# Patient Record
Sex: FEMALE | Race: WHITE | Employment: FULL TIME | URBAN - METROPOLITAN AREA
[De-identification: names, ages, dates, MRNs, and addresses within clinical notes are randomized per-mention and may not be internally consistent; named-entity substitution may affect disease eponyms.]

---

## 2017-09-08 ENCOUNTER — HOSPITAL ENCOUNTER (OUTPATIENT)
Dept: WOMENS IMAGING | Age: 57
Discharge: HOME | End: 2017-09-08
Attending: FAMILY MEDICINE | Admitting: FAMILY MEDICINE

## 2017-09-08 DIAGNOSIS — Z12.31 VISIT FOR SCREENING MAMMOGRAM: ICD-10-CM

## 2019-02-07 ENCOUNTER — HOSPITAL ENCOUNTER (OUTPATIENT)
Dept: WOMENS IMAGING | Age: 59
Discharge: HOME | End: 2019-02-07
Payer: COMMERCIAL

## 2019-02-07 DIAGNOSIS — Z12.31 VISIT FOR SCREENING MAMMOGRAM: ICD-10-CM

## 2019-02-07 PROCEDURE — 77063 BREAST TOMOSYNTHESIS BI: CPT

## 2020-02-10 ENCOUNTER — HOSPITAL ENCOUNTER (OUTPATIENT)
Dept: WOMENS IMAGING | Age: 60
Discharge: HOME | End: 2020-02-10
Payer: COMMERCIAL

## 2020-02-10 PROCEDURE — 77063 BREAST TOMOSYNTHESIS BI: CPT

## 2020-03-10 ENCOUNTER — APPOINTMENT (OUTPATIENT)
Dept: GENERAL RADIOLOGY | Age: 60
End: 2020-03-10
Payer: COMMERCIAL

## 2020-03-10 ENCOUNTER — HOSPITAL ENCOUNTER (EMERGENCY)
Age: 60
Discharge: HOME | End: 2020-03-10
Payer: COMMERCIAL

## 2020-03-10 VITALS
HEART RATE: 71 BPM | WEIGHT: 125.88 LBS | SYSTOLIC BLOOD PRESSURE: 131 MMHG | HEIGHT: 67 IN | DIASTOLIC BLOOD PRESSURE: 74 MMHG | RESPIRATION RATE: 18 BRPM | OXYGEN SATURATION: 99 % | TEMPERATURE: 97.6 F | BODY MASS INDEX: 19.76 KG/M2

## 2020-03-10 LAB
ANION GAP SERPL CALCULATED.3IONS-SCNC: 13 MMOL/L (ref 3–16)
BASOPHILS ABSOLUTE: 0 K/UL (ref 0–0.2)
BASOPHILS RELATIVE PERCENT: 0.5 %
BUN BLDV-MCNC: 10 MG/DL (ref 7–20)
CALCIUM SERPL-MCNC: 9.1 MG/DL (ref 8.3–10.6)
CHLORIDE BLD-SCNC: 103 MMOL/L (ref 99–110)
CO2: 23 MMOL/L (ref 21–32)
CREAT SERPL-MCNC: <0.5 MG/DL (ref 0.6–1.1)
EKG ATRIAL RATE: 74 BPM
EKG DIAGNOSIS: NORMAL
EKG P AXIS: 77 DEGREES
EKG P-R INTERVAL: 162 MS
EKG Q-T INTERVAL: 410 MS
EKG QRS DURATION: 74 MS
EKG QTC CALCULATION (BAZETT): 455 MS
EKG R AXIS: 57 DEGREES
EKG T AXIS: 59 DEGREES
EKG VENTRICULAR RATE: 74 BPM
EOSINOPHILS ABSOLUTE: 0 K/UL (ref 0–0.6)
EOSINOPHILS RELATIVE PERCENT: 0.8 %
GFR AFRICAN AMERICAN: >60
GFR NON-AFRICAN AMERICAN: >60
GLUCOSE BLD-MCNC: 102 MG/DL (ref 70–99)
HCT VFR BLD CALC: 39.7 % (ref 36–48)
HEMOGLOBIN: 13.4 G/DL (ref 12–16)
LYMPHOCYTES ABSOLUTE: 1.2 K/UL (ref 1–5.1)
LYMPHOCYTES RELATIVE PERCENT: 28.5 %
MCH RBC QN AUTO: 30.6 PG (ref 26–34)
MCHC RBC AUTO-ENTMCNC: 33.9 G/DL (ref 31–36)
MCV RBC AUTO: 90.5 FL (ref 80–100)
MONOCYTES ABSOLUTE: 0.3 K/UL (ref 0–1.3)
MONOCYTES RELATIVE PERCENT: 7 %
NEUTROPHILS ABSOLUTE: 2.7 K/UL (ref 1.7–7.7)
NEUTROPHILS RELATIVE PERCENT: 63.2 %
PDW BLD-RTO: 13.7 % (ref 12.4–15.4)
PLATELET # BLD: 161 K/UL (ref 135–450)
PMV BLD AUTO: 8.9 FL (ref 5–10.5)
POTASSIUM SERPL-SCNC: 3.9 MMOL/L (ref 3.5–5.1)
RBC # BLD: 4.38 M/UL (ref 4–5.2)
SODIUM BLD-SCNC: 139 MMOL/L (ref 136–145)
TROPONIN: <0.01 NG/ML
WBC # BLD: 4.3 K/UL (ref 4–11)

## 2020-03-10 PROCEDURE — 71046 X-RAY EXAM CHEST 2 VIEWS: CPT

## 2020-03-10 PROCEDURE — 93005 ELECTROCARDIOGRAM TRACING: CPT | Performed by: PHYSICIAN ASSISTANT

## 2020-03-10 PROCEDURE — 93010 ELECTROCARDIOGRAM REPORT: CPT | Performed by: INTERNAL MEDICINE

## 2020-03-10 PROCEDURE — 84484 ASSAY OF TROPONIN QUANT: CPT

## 2020-03-10 PROCEDURE — 80048 BASIC METABOLIC PNL TOTAL CA: CPT

## 2020-03-10 PROCEDURE — 85025 COMPLETE CBC W/AUTO DIFF WBC: CPT

## 2020-03-10 PROCEDURE — 99284 EMERGENCY DEPT VISIT MOD MDM: CPT

## 2020-03-10 RX ORDER — METHOCARBAMOL 750 MG/1
750 TABLET, FILM COATED ORAL 2 TIMES DAILY
Qty: 20 TABLET | Refills: 0 | Status: SHIPPED | OUTPATIENT
Start: 2020-03-10 | End: 2021-03-11

## 2020-03-10 ASSESSMENT — ENCOUNTER SYMPTOMS
FACIAL SWELLING: 0
SHORTNESS OF BREATH: 0
APNEA: 0
SORE THROAT: 0
VOMITING: 0
EYE REDNESS: 0
ABDOMINAL PAIN: 0
EYE DISCHARGE: 0
BACK PAIN: 0
NAUSEA: 0
CHOKING: 0

## 2020-03-10 ASSESSMENT — PAIN SCALES - GENERAL: PAINLEVEL_OUTOF10: 6

## 2020-03-10 ASSESSMENT — PAIN DESCRIPTION - DESCRIPTORS: DESCRIPTORS: ACHING

## 2020-03-10 ASSESSMENT — PAIN DESCRIPTION - ORIENTATION: ORIENTATION: UPPER

## 2020-03-10 ASSESSMENT — PAIN DESCRIPTION - LOCATION: LOCATION: ARM

## 2020-03-10 ASSESSMENT — PAIN DESCRIPTION - PAIN TYPE: TYPE: ACUTE PAIN

## 2020-03-10 ASSESSMENT — PAIN DESCRIPTION - PROGRESSION: CLINICAL_PROGRESSION: GRADUALLY WORSENING

## 2020-03-10 ASSESSMENT — PAIN DESCRIPTION - FREQUENCY: FREQUENCY: CONTINUOUS

## 2020-03-10 NOTE — ED PROVIDER NOTES
**EVALUATED BY ADVANCED PRACTICE PROVIDER**        [address] [facility]      Pt Name: Lashonda Rizzo  AIDAN:[id number]  [place] 1960  Date of evaluation: 3/10/2020  Provider: Beena Nicholson PA-C      Chief Complaint:    Chief Complaint   Patient presents with    Arm Pain     left upper arm starting last night. squeezing sensation. deneis numbness, tingling, denies weakness. pt reports these episodes have happened in the past.        Nursing Notes, Past Medical Hx, Past Surgical Hx, Social Hx, Allergies, and Family Hx were all reviewed and agreed with or any disagreements were addressed in the HPI.    HPI:  (Location, Duration, Timing, Severity, Quality, Assoc Sx, Context, Modifying factors)  This is a  64 y.o. female complaint of left arm tightness. She says her upper arm is been getting and feeling tight like it was squeezing like go. She is concerned for possible heart attack. She denies shortness of breath, no chest pain, no extremity weakness. No back pain or neck pain. Denies abdominal pain. No nausea vomiting. No diaphoresis. And she is unsure if is related to her not sleeping well. Her sleep patterns been off she did not sleep 2 or 3 hours and then for couple hours and go back to bed for couple hours. This is been going on for the last couple of weeks. She does work third shift but she is been working third shift for 11 years. PastMedical/Surgical History:      Diagnosis Date    Diverticulitis     Dysmenorrhea     Heavy periods     Irregular uterine bleeding     Nerve damage     Ovarian cyst          Procedure Laterality Date     SECTION      HERNIA REPAIR      OVARIAN CYST REMOVAL      THYROID SURGERY      TUBAL LIGATION         Medications:  Previous Medications    No medications on file         Review of Systems:  Review of Systems   Constitutional: Negative for chills and fever.    HENT: Negative for congestion, facial swelling and sore throat. Eyes: Negative for discharge and redness. Respiratory: Negative for apnea, choking and shortness of breath. Cardiovascular: Negative for chest pain. Gastrointestinal: Negative for abdominal pain, nausea and vomiting. Genitourinary: Negative for dysuria. Musculoskeletal: Negative for back pain, neck pain and neck stiffness. Neurological: Negative for dizziness, tremors, seizures, weakness and headaches. All other systems reviewed and are negative. Positives and Pertinent negatives as per HPI. Except as noted above in the ROS, problem specific ROS was completed and is negative. Physical Exam:  Physical Exam  Vitals signs and nursing note reviewed. Constitutional:       Appearance: She is well-developed. She is not diaphoretic. HENT:      Head: Normocephalic and atraumatic. Nose: Nose normal.   Eyes:      General:         Right eye: No discharge. Left eye: No discharge. Extraocular Movements: Extraocular movements intact. Conjunctiva/sclera: Conjunctivae normal.      Pupils: Pupils are equal, round, and reactive to light. Neck:      Musculoskeletal: Normal range of motion and neck supple. Cardiovascular:      Rate and Rhythm: Normal rate and regular rhythm. Heart sounds: Normal heart sounds. No murmur. No friction rub. No gallop. Pulmonary:      Effort: Pulmonary effort is normal. No respiratory distress. Breath sounds: Normal breath sounds. No wheezing or rales. Chest:      Chest wall: No tenderness. Abdominal:      General: Abdomen is flat. Bowel sounds are normal. There is no distension. Palpations: Abdomen is soft. There is no mass. Tenderness: There is no abdominal tenderness. Hernia: No hernia is present. Musculoskeletal: Normal range of motion. Left shoulder: She exhibits normal range of motion, no tenderness, no bony tenderness, no swelling and no deformity.       Left wrist: She exhibits normal range of motion, no tenderness and no bony tenderness. Left upper arm: She exhibits no tenderness, no bony tenderness, no swelling, no edema and no deformity. Left forearm: She exhibits no tenderness, no bony tenderness, no swelling and no deformity. Skin:     General: Skin is warm and dry. Neurological:      Mental Status: She is alert and oriented to person, place, and time. Psychiatric:         Behavior: Behavior normal.         MEDICAL DECISION MAKING    Vitals:    Vitals:    03/10/20 1407   BP: 125/78   Pulse: 72   Resp: 16   Temp: 97.4 °F (36.3 °C)   TempSrc: Oral   SpO2: 98%   Weight: 125 lb 14.1 oz (57.1 kg)   Height: 5' 7\" (1.702 m)       LABS:  Labs Reviewed   BASIC METABOLIC PANEL - Abnormal; Notable for the following components:       Result Value    Glucose 102 (*)     CREATININE <0.5 (*)     All other components within normal limits    Narrative:     Performed at:  [address] [address]   Phone [phone]   CBC WITH AUTO DIFFERENTIAL    Narrative:     Performed at:  [address] [address]   Phone [phone]   TROPONIN    Narrative:     Performed at:  [address] [address]   Phone [phone] of labs reviewed and werenegative at this time or not returned at the time of this note. RADIOLOGY:   Non-plain film images such as CT, Ultrasound and MRI are read by the radiologist. Magnolia Tineo PA-C have directly visualized the radiologic plain film image(s) with the below findings:        Interpretation per the Radiologist below, if available at the time of this note:    XR CHEST STANDARD (2 VW)   Final Result   No acute cardiopulmonary disease.               Otoniel Mikey Digital Screen Bilateral    Result Date: 2/10/2020  EXAMINATION: was available for consultation as needed. The patient and / or the family were informed of the results of any tests, a time was given to answer questions, a plan was proposed and they agreed with plan. CLINICAL IMPRESSION:  1.  Left upper arm pain        DISPOSITION Decision To Discharge 03/10/2020 04:59:37 PM      PATIENT REFERRED TO:  Ronald Ledezma MD  [address] [place] [address]  [phone]    Call in 1 day        DISCHARGE MEDICATIONS:  New Prescriptions    METHOCARBAMOL (ROBAXIN-750) 750 MG TABLET    Take 1 tablet by mouth 2 times daily       DISCONTINUED MEDICATIONS:  Discontinued Medications    No medications on file              (Please note the MDM and HPI sections of this note were completed with a voice recognition program.  Efforts were made to edit the dictations but occasionally words are mis-transcribed.)    Electronically signed, Lamine Reyes PA-C,          Lamine Reyes PA-C  03/10/20 0217

## 2020-03-24 ENCOUNTER — TELEPHONE (OUTPATIENT)
Dept: ORTHOPEDIC SURGERY | Age: 60
End: 2020-03-24

## 2020-06-11 ENCOUNTER — HOSPITAL ENCOUNTER (EMERGENCY)
Age: 60
Discharge: HOME | End: 2020-06-11
Attending: EMERGENCY MEDICINE
Payer: COMMERCIAL

## 2020-06-11 VITALS
RESPIRATION RATE: 16 BRPM | OXYGEN SATURATION: 96 % | SYSTOLIC BLOOD PRESSURE: 122 MMHG | BODY MASS INDEX: 19.3 KG/M2 | TEMPERATURE: 97.7 F | WEIGHT: 123.24 LBS | HEART RATE: 93 BPM | DIASTOLIC BLOOD PRESSURE: 80 MMHG

## 2020-06-11 PROCEDURE — 99282 EMERGENCY DEPT VISIT SF MDM: CPT

## 2020-06-11 RX ORDER — CYCLOBENZAPRINE HCL 5 MG
5 TABLET ORAL 2 TIMES DAILY PRN
Qty: 10 TABLET | Refills: 0 | Status: SHIPPED | OUTPATIENT
Start: 2020-06-11 | End: 2020-06-21

## 2020-06-11 RX ORDER — CYCLOBENZAPRINE HCL 5 MG
5 TABLET ORAL 2 TIMES DAILY PRN
Qty: 10 TABLET | Refills: 0 | Status: SHIPPED | OUTPATIENT
Start: 2020-06-11 | End: 2020-06-11 | Stop reason: SDUPTHER

## 2020-06-11 ASSESSMENT — PAIN DESCRIPTION - ORIENTATION: ORIENTATION: RIGHT

## 2020-06-11 ASSESSMENT — ENCOUNTER SYMPTOMS
VOMITING: 0
NAUSEA: 0

## 2020-06-11 ASSESSMENT — PAIN DESCRIPTION - FREQUENCY: FREQUENCY: CONTINUOUS

## 2020-06-11 ASSESSMENT — PAIN SCALES - GENERAL: PAINLEVEL_OUTOF10: 6

## 2020-06-11 ASSESSMENT — PAIN DESCRIPTION - DESCRIPTORS: DESCRIPTORS: BURNING;TIGHTNESS

## 2020-06-11 ASSESSMENT — PAIN DESCRIPTION - LOCATION: LOCATION: LEG

## 2020-06-11 NOTE — ED PROVIDER NOTES
SpO2 Height Weight   122/80 97.7 °F (36.5 °C) Oral 93 16 96 % -- 123 lb 3.8 oz (55.9 kg)       Physical Exam  Vitals signs and nursing note reviewed. Constitutional:       General: She is not in acute distress. Appearance: She is well-developed. She is not ill-appearing, toxic-appearing or diaphoretic. Comments: Well-appearing, nontoxic, not in acute distress. Answers questions in full sentences and following verbal commands appropriately   HENT:      Head: Normocephalic and atraumatic. Eyes:      General:         Right eye: No discharge. Left eye: No discharge. Conjunctiva/sclera: Conjunctivae normal.   Neck:      Musculoskeletal: Normal range of motion and neck supple. Cardiovascular:      Pulses:           Dorsalis pedis pulses are 2+ on the right side and 2+ on the left side. Pulmonary:      Effort: Pulmonary effort is normal. No respiratory distress. Musculoskeletal: Normal range of motion. Right upper leg: She exhibits tenderness. She exhibits no bony tenderness, no swelling, no edema, no deformity and no laceration. Skin:     Coloration: Skin is not pale. Neurological:      Mental Status: She is alert and oriented to person, place, and time. Deep Tendon Reflexes:      Reflex Scores:       Patellar reflexes are 2+ on the right side and 2+ on the left side. Achilles reflexes are 2+ on the right side and 2+ on the left side. Comments: Gait normal   Psychiatric:         Behavior: Behavior normal. Behavior is cooperative.          DIAGNOSTIC RESULTS     EKG: All EKG's are interpreted by the Emergency Department Physicianwho either signs or Co-signs this chart in the absence of a cardiologist.      RADIOLOGY:   Non-plain film images such as CT, Ultrasound and MRI are read by the radiologist. Plain radiographic images are visualized and preliminarily interpreted by the emergency physician with the below findings:      Interpretation per the Radiologist below,

## 2020-06-13 ENCOUNTER — APPOINTMENT (OUTPATIENT)
Dept: CT IMAGING | Age: 60
End: 2020-06-13
Payer: COMMERCIAL

## 2020-06-13 ENCOUNTER — HOSPITAL ENCOUNTER (EMERGENCY)
Age: 60
Discharge: HOME | End: 2020-06-13
Attending: EMERGENCY MEDICINE
Payer: COMMERCIAL

## 2020-06-13 VITALS
SYSTOLIC BLOOD PRESSURE: 138 MMHG | RESPIRATION RATE: 20 BRPM | WEIGHT: 123.9 LBS | HEART RATE: 86 BPM | TEMPERATURE: 98.4 F | HEIGHT: 64 IN | OXYGEN SATURATION: 98 % | DIASTOLIC BLOOD PRESSURE: 70 MMHG | BODY MASS INDEX: 21.15 KG/M2

## 2020-06-13 PROCEDURE — 3209999900 CT LUMBAR SPINE TRAUMA RECONSTRUCTION

## 2020-06-13 PROCEDURE — 74176 CT ABD & PELVIS W/O CONTRAST: CPT

## 2020-06-13 PROCEDURE — 6370000000 HC RX 637 (ALT 250 FOR IP): Performed by: EMERGENCY MEDICINE

## 2020-06-13 PROCEDURE — 6360000002 HC RX W HCPCS: Performed by: EMERGENCY MEDICINE

## 2020-06-13 PROCEDURE — 99283 EMERGENCY DEPT VISIT LOW MDM: CPT

## 2020-06-13 RX ORDER — ONDANSETRON 4 MG/1
4 TABLET, FILM COATED ORAL ONCE
Status: COMPLETED | OUTPATIENT
Start: 2020-06-13 | End: 2020-06-13

## 2020-06-13 RX ORDER — FENTANYL CITRATE 50 UG/ML
100 INJECTION, SOLUTION INTRAMUSCULAR; INTRAVENOUS ONCE
Status: COMPLETED | OUTPATIENT
Start: 2020-06-13 | End: 2020-06-13

## 2020-06-13 RX ORDER — DEXAMETHASONE 4 MG/1
4 TABLET ORAL ONCE
Status: COMPLETED | OUTPATIENT
Start: 2020-06-13 | End: 2020-06-13

## 2020-06-13 RX ORDER — MELOXICAM 7.5 MG/1
7.5 TABLET ORAL DAILY
Qty: 90 TABLET | Refills: 1 | OUTPATIENT
Start: 2020-06-13 | End: 2021-03-11

## 2020-06-13 RX ORDER — HYDROCODONE BITARTRATE AND ACETAMINOPHEN 5; 325 MG/1; MG/1
1 TABLET ORAL EVERY 4 HOURS PRN
Qty: 18 TABLET | Refills: 0 | Status: SHIPPED | OUTPATIENT
Start: 2020-06-13 | End: 2020-06-16

## 2020-06-13 RX ORDER — METHOCARBAMOL 500 MG/1
500 TABLET, FILM COATED ORAL 4 TIMES DAILY
Qty: 40 TABLET | Refills: 0 | Status: SHIPPED | OUTPATIENT
Start: 2020-06-13 | End: 2020-06-23

## 2020-06-13 RX ADMIN — FENTANYL CITRATE 100 MCG: 50 INJECTION, SOLUTION INTRAMUSCULAR; INTRAVENOUS at 20:00

## 2020-06-13 RX ADMIN — ONDANSETRON HYDROCHLORIDE 4 MG: 4 TABLET, FILM COATED ORAL at 20:00

## 2020-06-13 RX ADMIN — DEXAMETHASONE 4 MG: 4 TABLET ORAL at 20:00

## 2020-06-13 ASSESSMENT — PAIN DESCRIPTION - LOCATION: LOCATION: BACK;LEG

## 2020-06-13 ASSESSMENT — PAIN DESCRIPTION - PROGRESSION
CLINICAL_PROGRESSION: RESOLVED
CLINICAL_PROGRESSION: GRADUALLY WORSENING

## 2020-06-13 ASSESSMENT — PAIN SCALES - GENERAL: PAINLEVEL_OUTOF10: 10

## 2020-06-13 ASSESSMENT — PAIN DESCRIPTION - FREQUENCY: FREQUENCY: CONTINUOUS

## 2020-06-13 NOTE — ED PROVIDER NOTES
10 tablet 0    methocarbamol (ROBAXIN-750) 750 MG tablet Take 1 tablet by mouth 2 times daily 20 tablet 0     No Known Allergies    Nursing Notes Reviewed    Physical Exam:  Triage VS:    ED Triage Vitals [06/13/20 1929]   Enc Vitals Group      /70      Pulse 86      Resp 20      Temp 98.4 °F (36.9 °C)      Temp Source Oral      SpO2 98 %      Weight 123 lb 14.4 oz (56.2 kg)      Height 5' 4\" (1.626 m)      Head Circumference       Peak Flow       Pain Score       Pain Loc       Pain Edu? Excl. in [address]? My pulse ox interpretation is - normal    General appearance:  No acute distress. Skin:  Warm. Dry. No Rash   Eye:  Extraocular movements intact. Ears, nose, mouth and throat:  Oral mucosa moist   Neck:  Trachea midline. Extremity:  No swelling. Normal ROM     Heart:  Regular  Perfusion:  intact  Respiratory:   Respirations nonlabored. Abdominal:  Normal bowel sounds. Soft. Nontender. Non distended. Back:  No CVA tenderness to palpation, no midline spinal tenderness to palpation or step-offs, mild positive point tenderness at T12-L1 and L2 as well as bilateral paraspinal muscle tenderness to palpation, no spasm             Neurological:  Alert and oriented times 3.  5 out of 5 motor strength bilateral lower extremities, sensation intact to light touch in bilateral lower                                  extremities, lower extremity reflexes of the patella and achilles are equal and intact. Straight leg test is not present. Antalgic gait. I have reviewed and interpreted all of the currently available lab results from this visit (if applicable):  No results found for this visit on 06/13/20. Radiographs (if obtained):  Radiologist's Report Reviewed:  No results found. MDM:  Patient presented with back pain.   There is no evidence for more malignant injury/pathology, such as, but not limited to, cauda equina syndrome, acute spinal cord pathology, Spinal epidural abscess    I

## 2020-06-14 NOTE — ED NOTES
Discharge instructions reviewed with pt and pt denied having any questions. Discharge paperwork signed and pt discharged.         Steven Smith RN  06/13/20 6950

## 2020-08-29 ENCOUNTER — APPOINTMENT (OUTPATIENT)
Dept: GENERAL RADIOLOGY | Age: 60
End: 2020-08-29
Payer: COMMERCIAL

## 2020-08-29 ENCOUNTER — HOSPITAL ENCOUNTER (EMERGENCY)
Age: 60
Discharge: HOME | End: 2020-08-29
Attending: EMERGENCY MEDICINE
Payer: COMMERCIAL

## 2020-08-29 VITALS
DIASTOLIC BLOOD PRESSURE: 82 MMHG | BODY MASS INDEX: 21.95 KG/M2 | OXYGEN SATURATION: 98 % | SYSTOLIC BLOOD PRESSURE: 136 MMHG | HEART RATE: 76 BPM | TEMPERATURE: 98.2 F | WEIGHT: 127.87 LBS | RESPIRATION RATE: 16 BRPM

## 2020-08-29 PROCEDURE — 99284 EMERGENCY DEPT VISIT MOD MDM: CPT

## 2020-08-29 PROCEDURE — 73030 X-RAY EXAM OF SHOULDER: CPT

## 2020-08-29 RX ORDER — LIDOCAINE 50 MG/G
1 PATCH TOPICAL DAILY
Qty: 10 PATCH | Refills: 0 | Status: SHIPPED | OUTPATIENT
Start: 2020-08-29 | End: 2020-09-08

## 2020-08-29 RX ORDER — TIZANIDINE 4 MG/1
4 TABLET ORAL EVERY 6 HOURS PRN
Qty: 20 TABLET | Refills: 0 | Status: SHIPPED | OUTPATIENT
Start: 2020-08-29 | End: 2021-03-11

## 2020-08-29 ASSESSMENT — PAIN SCALES - GENERAL
PAINLEVEL_OUTOF10: 10
PAINLEVEL_OUTOF10: 0
PAINLEVEL_OUTOF10: 0

## 2020-08-29 ASSESSMENT — ENCOUNTER SYMPTOMS
COUGH: 0
PHOTOPHOBIA: 0
ABDOMINAL PAIN: 0
COLOR CHANGE: 0
TROUBLE SWALLOWING: 0
VOMITING: 0
SHORTNESS OF BREATH: 0

## 2020-08-29 ASSESSMENT — PAIN DESCRIPTION - FREQUENCY: FREQUENCY: CONTINUOUS

## 2020-08-29 ASSESSMENT — PAIN DESCRIPTION - PROGRESSION
CLINICAL_PROGRESSION: RESOLVED
CLINICAL_PROGRESSION: GRADUALLY WORSENING

## 2020-08-29 ASSESSMENT — PAIN DESCRIPTION - ONSET: ONSET: ON-GOING

## 2020-08-29 ASSESSMENT — PAIN DESCRIPTION - PAIN TYPE: TYPE: ACUTE PAIN;CHRONIC PAIN

## 2020-08-29 ASSESSMENT — PAIN DESCRIPTION - ORIENTATION: ORIENTATION: LEFT

## 2020-08-29 ASSESSMENT — PAIN - FUNCTIONAL ASSESSMENT: PAIN_FUNCTIONAL_ASSESSMENT: PREVENTS OR INTERFERES SOME ACTIVE ACTIVITIES AND ADLS

## 2020-08-29 ASSESSMENT — PAIN DESCRIPTION - DESCRIPTORS: DESCRIPTORS: DISCOMFORT;TENDER

## 2020-08-29 ASSESSMENT — PAIN DESCRIPTION - LOCATION: LOCATION: ARM;SHOULDER

## 2020-08-29 NOTE — ED PROVIDER NOTES
and frequency. Musculoskeletal: Positive for arthralgias and myalgias. Negative for gait problem and neck pain. Skin: Negative for color change, pallor and rash. Neurological: Negative for dizziness, light-headedness and headaches. Psychiatric/Behavioral: Negative for confusion. The patient is not nervous/anxious. All other systems reviewed and are negative. Except as noted above the remainder of the review of systems was reviewed and negative. PAST MEDICAL HISTORY     Past Medical History:   Diagnosis Date    Diverticulitis     Dysmenorrhea     Heavy periods     Irregular uterine bleeding     Nerve damage     Ovarian cyst          SURGICALHISTORY       Past Surgical History:   Procedure Laterality Date     SECTION      HERNIA REPAIR      OVARIAN CYST REMOVAL      THYROID SURGERY      TUBAL LIGATION           CURRENT MEDICATIONS       Discharge Medication List as of 2020  3:44 AM      CONTINUE these medications which have NOT CHANGED    Details   meloxicam (MOBIC) 7.5 MG tablet Take 1 tablet by mouth daily, Disp-90 tablet, R-1Print      methocarbamol (ROBAXIN-750) 750 MG tablet Take 1 tablet by mouth 2 times daily, Disp-20 tablet, R-0Print                  Nsaids and Orphenadrine citrate    FAMILY HISTORY       Family History   Problem Relation Age of Onset    Cancer Sister 61        uterine          SOCIAL HISTORY       Social History     Socioeconomic History    Marital status:       Spouse name: None    Number of children: None    Years of education: None    Highest education level: None   Occupational History    None   Social Needs    Financial resource strain: None    Food insecurity     Worry: None     Inability: None    Transportation needs     Medical: None     Non-medical: None   Tobacco Use    Smoking status: Former Smoker     Packs/day: 0.50     Years: 5.00     Pack years: 2.50     Types: Cigarettes    Smokeless tobacco: Never Used   Substance and Sexual Activity    Alcohol use: No    Drug use: No    Sexual activity: Yes     Partners: Male   Lifestyle    Physical activity     Days per week: None     Minutes per session: None    Stress: None   Relationships    Social connections     Talks on phone: None     Gets together: None     Attends [employer]ical service: None     Active member of club or organization: None     Attends meetings of clubs or organizations: None     Relationship status: None    Intimate partner violence     Fear of current or ex partner: None     Emotionally abused: None     Physically abused: None     Forced sexual activity: None   Other Topics Concern    None   Social History Narrative    None       SCREENINGS             PHYSICAL EXAM    (up to 7 for level 4, 8 or more for level 5)     ED Triage Vitals   BP Temp Temp Source Pulse Resp SpO2 Height Weight   08/29/20 0106 08/29/20 0105 08/29/20 0105 08/29/20 0105 08/29/20 0105 08/29/20 0105 -- 08/29/20 0104   (!) 161/84 98.3 °F (36.8 °C) Oral 90 16 95 %  127 lb 13.9 oz (58 kg)       Physical Exam  Vitals signs and nursing note reviewed. Constitutional:       Appearance: She is well-developed. HENT:      Head: Normocephalic and atraumatic. Mouth/Throat:      Mouth: Mucous membranes are dry. Eyes:      Extraocular Movements: Extraocular movements intact. Conjunctiva/sclera: Conjunctivae normal.      Pupils: Pupils are equal, round, and reactive to light. Neck:      Musculoskeletal: Normal range of motion. Trachea: No tracheal deviation. Cardiovascular:      Rate and Rhythm: Normal rate and regular rhythm. Pulses: Normal pulses. Heart sounds: Normal heart sounds. Pulmonary:      Effort: Pulmonary effort is normal.      Breath sounds: Normal breath sounds. Abdominal:      General: There is no distension. Palpations: Abdomen is soft. Tenderness: There is no abdominal tenderness. There is no guarding or rebound.    Musculoskeletal: Normal range of motion. General: No swelling, tenderness, deformity or signs of injury. Skin:     General: Skin is warm and dry. Capillary Refill: Capillary refill takes less than 2 seconds. Neurological:      General: No focal deficit present. Mental Status: She is alert and oriented to person, place, and time. Sensory: No sensory deficit. Motor: No weakness. RESULTS     EKG: All EKG's are interpreted by the Emergency Department Physician who either signs or Co-signsthis chart in the absence of a cardiologist.    RADIOLOGY:   Maral Neat such as CT, Ultrasound and MRI are read by the radiologist. Plain radiographic images are visualized and preliminarily interpreted by the emergency physician with the below findings:      Interpretation per the Radiologist below, if available at the time ofthis note:    XR SHOULDER LEFT (MIN 2 VIEWS)   Final Result   No acute abnormality. ED BEDSIDE ULTRASOUND:   Performed by ED Physician - none    LABS:  Labs Reviewed - No data to display    All other labs were within normal range or not returned as of this dictation. EMERGENCY DEPARTMENT COURSE and DIFFERENTIAL DIAGNOSIS/MDM:   Vitals:    Vitals:    08/29/20 0104 08/29/20 0105 08/29/20 0106 08/29/20 0328   BP:   (!) 161/84 136/82   Pulse:  90  76   Resp:  16  16   Temp:  98.3 °F (36.8 °C)  98.2 °F (36.8 °C)   TempSrc:  Oral  Oral   SpO2:  95%  98%   Weight: 127 lb 13.9 oz (58 kg)          Patient was given thefollowing medications:  Medications - No data to display    ED COURSE & MEDICAL DECISION MAKING    Pertinent Labs & Imaging studies reviewed. (See chart for details)   -  Patient seen and evaluated in the emergency department. -  Triage and nursing notes reviewed and incorporated. -  Old chart records reviewed and incorporated.   -  Differential diagnosis includes: Differential diagnosis: includes but not limited to Arterial Injury/Ischemia, Fracture, Dislocation, Infection, Compartment Syndrome, Neurologic Deficit/Injury. -  Work-up included:  See above  -  ED treatment included: See above  -  Results discussed with patient. . Imaging studies show no acute S normalities. The patient's pain is reproducible with positioning and movement and have low suspicion for the pulmonary cardiac etiology. Discussed treating the patient with topical lidocaine muscle relaxers. Patient feels well on reevaluation and has had no symptoms while in the emergency department. The patient is agreeable with plan of care and disposition. REASSESSMENT          CRITICAL CARE TIME   Total Critical Care time was 0 minutes, excluding separatelyreportable procedures. There was a high probability ofclinically significant/life threatening deterioration in the patient's condition which required my urgent intervention. CONSULTS:  None    PROCEDURES:  Unless otherwise noted below, none     Procedures    FINAL IMPRESSION      1.  Left shoulder pain, unspecified chronicity          DISPOSITION/PLAN   DISPOSITION Decision To Discharge 08/29/2020 02:37:11 AM      PATIENT REFERREDTO:  Eleazar Cruz MD  [address] [facility] [address]  [phone]    Schedule an appointment as soon as possible for a visit   As needed    [place] [phone]  Schedule an appointment as soon as possible for a visit   As needed      DISCHARGEMEDICATIONS:  Discharge Medication List as of 8/29/2020  3:44 AM      START taking these medications    Details   tiZANidine (ZANAFLEX) 4 MG tablet Take 1 tablet by mouth every 6 hours as needed (shoulder pain), Disp-20 tablet,R-0Print      lidocaine (LIDODERM) 5 % Place 1 patch onto the skin daily for 10 days 12 hours on, 12 hours off., Disp-10 patch,R-0Print                (Please note that portions of this note were completed with a voice recognition program.  Efforts were made to edit the dictations but occasionally words are mis-transcribed.)    Janie Ruvalcaba MD (electronically signed)  Attending Emergency Physician          Janie Ruvalcaba MD  08/29/20 4492

## 2020-08-29 NOTE — ED NOTES
Pt ambulated to ER bed #10 with a steady gait. No concerns voiced at this time.       Eunice Hodge RN  08/29/20 6717

## 2021-01-29 ENCOUNTER — HOSPITAL ENCOUNTER (OUTPATIENT)
Age: 61
Discharge: HOME | End: 2021-01-29
Payer: COMMERCIAL

## 2021-01-29 ENCOUNTER — HOSPITAL ENCOUNTER (OUTPATIENT)
Dept: GENERAL RADIOLOGY | Age: 61
Discharge: HOME | End: 2021-01-29
Payer: COMMERCIAL

## 2021-01-29 DIAGNOSIS — S76.012A HIP STRAIN, LEFT, INITIAL ENCOUNTER: ICD-10-CM

## 2021-01-29 PROCEDURE — 73502 X-RAY EXAM HIP UNI 2-3 VIEWS: CPT

## 2021-02-10 ENCOUNTER — HOSPITAL ENCOUNTER (OUTPATIENT)
Dept: PHYSICAL THERAPY | Age: 61
Setting detail: THERAPIES SERIES
Discharge: HOME | End: 2021-02-10
Payer: COMMERCIAL

## 2021-02-10 NOTE — FLOWSHEET NOTE
Physical Therapy  Cancellation/No-show Note  Patient Name:  Deanne Blanco  :  1960   Date:  2/10/2021  Cancelled visits to date: 1 2/10/21 Initial evaluation  No-shows to date: 0    For today's appointment patient:  [x]  Cancelled  []  Rescheduled appointment  []  No-show     Reason given by patient:  []  Patient ill  []  Conflicting appointment  []  No transportation    []  Conflict with work  []  No reason given  [x]  Other:     Comments:  2/10/21 weather related     Electronically signed by:  Brandyn Montez PT

## 2021-02-11 ENCOUNTER — HOSPITAL ENCOUNTER (OUTPATIENT)
Dept: PHYSICAL THERAPY | Age: 61
Setting detail: THERAPIES SERIES
Discharge: HOME | End: 2021-02-11
Payer: COMMERCIAL

## 2021-02-11 PROCEDURE — 97530 THERAPEUTIC ACTIVITIES: CPT

## 2021-02-11 PROCEDURE — 97110 THERAPEUTIC EXERCISES: CPT

## 2021-02-11 PROCEDURE — 97162 PT EVAL MOD COMPLEX 30 MIN: CPT

## 2021-02-11 NOTE — PLAN OF CARE
[facility]. [address]  Phone: [phone]   Fax:     [phone]                                                       Physical Therapy Certification    Dear Referring Practitioner: Dr. Gomez,    We had the pleasure of evaluating the following patient for physical therapy services at [facility]. A summary of our findings can be found in the initial assessment below. This includes our plan of care. If you have any questions or concerns regarding these findings, please do not hesitate to contact me at the office phone number checked above. Thank you for the referral.       Physician Signature:_______________________________Date:__________________  By signing above (or electronic signature), therapists plan is approved by physician                  Patient: Elizabeth Roche   : 1960   MRN: [id number]  Referring Physician: Referring Practitioner: Dr. Gomez      Evaluation Date: 2021      Medical Diagnosis Information:  Diagnosis: M54.5 (ICD-10-CM) - Low back pain   Treatment Diagnosis: decreased abilty to function                                         Insurance information: PT Insurance Information: [employer]   DOI- 2020  Precautions/ Contra-indications:   Latex Allergy:  [x]NO      []YES  Preferred Language for Healthcare:   [x]English       []other:    C-SSRS Triggered by Intake questionnaire (Past 2 wk assessment ):   [x] No, Questionnaire did not trigger screening.   [] Yes, Patient intake triggered C-SSRS Screening      [] C-SSRS Screening completed  [] PCP notified via Epic     SUBJECTIVE: Patient is a 62 y/o female who was pulling a skid at [employer] and unloading them on 20. She started having  left thigh first and then back pain a few days later. She has had nothing done except for xrays since. She had an mri today.   She c/o constant sharp nerve pain in her bialteral lb, hips and into her bilateral buttocks and hamstrings as well as left thigh and hip. She also has tingling in her vaginal area and tailbone. She says the pain is severe at times. She has used heating pads icy hot and some other things to help her back. She   Does  not know what makes it hurt more or feel better . She is working on light duty at this time. Denies changes in bowel or bladder.      Relevant Medical History:Additional Pertinent Hx: Natural fusion of C5,C6  Functional Outcome Measure: [place] =60    Pain Scale:4-7/10  Easing factors: she is not sure  Provocative factors:she is not sore    Type: [x]Constant   []Intermittent  []Radiating []Localized []other:     Numbness/Tingling: - above both hips, bilateral buttocks, left thigh, bilateral hams, right lower thigh, vagina    Occupation/School:-[employer] employee on light duty    Living Status/Prior Level of Function: Independent with ADLs and IADLs,     OBJECTIVE:   Posture:-left crest high, increased lumbar lordosis,         Palpation: right post rotated ilium, tight and tender in bilateral gluts, piriformis,     Gait: -ambulates with antalgic gait sb to the right , no arm swing or lumbar rotation        Repeated Movements- ext painful , flex is     ROM  Comments   Lumbar Flex wfl    Lumbar Ext 5 painful in right lb      ROM LEFT RIGHT Comments   Lumbar Side Bend 10 10 Pain in her right lb   Lumbar Rotation 50 50    Quadrant  + painful   Hip Flexion 100 100    Hip Abd 25 25    Hip ER 30 30    Hip IR 20 20    Hip Extension 5 5    Knee Ext      Knee Flex      Hamstring Flex -20 -20    Piriformis tight tight    Tanner test  + +             Myotomes/Strength Normal Abnormal Comments   [x]ALL NORMAL      Hip Abd 4 4    Hip Ext 4 4    Hip flexion (L1-L2 femoral) [x] []    Knee extension (L2-L4 femoral) [x] []    Knee flexion (S1 sciatic)      Dorsiflexion (L4-L5 deep peroneal) [x] []    Great Toe Ext (L5 deep peroneal nerve) [x] []    Ankle Eversion (S1-S2 super peroneal) [x] []    Ankle PF(S1-S2 tibial) [x] []    Multifidus [] [] 2/5   Transverse Ab [] [] 2/5     Dermatomes Normal Abnormal Comments   [x]ALL NORMAL         C/o tingling all around hips, bilateral hams, gluts, vagina   inguinal area (L1)  [] [x]    anterior mid-thigh (L2) [] [x]    distal ant thigh/med knee (L3) [] []    medial lower leg and foot (L4) [x] []    lateral lower leg and foot (L5) [x] []    posterior calf (S1) [x] []    medial calcaneus (S2) [x] []      Reflexes Normal Abnormal Comments   [x]ALL NORMAL            S1-2 Seated achilles [] [] Hyper reflexive with all   S1-2 Prone knee bend [] []    L3-4 Patellar tendon [] []    C5-6 Biceps [] []    C6 Brachioradialis [] []    C7-8 Triceps [] []    Clonus [] []    Babinski [] []    Garcia's [] []      Joint mobility:    []Normal    [x]Hypo   []Hyper        Orthopedic Special Tests:  Neural dynamic tension testing Normal Abnormal Comments   Slump Test  - Degree of knee flexion:  [x] []    SLR  [] []    0-30 [x] []    30-70 [x] []    Femoral nerve (L2-4) [] []       Normal Abnormal N/A Comments   Fwd Bend-aberrant or innominate mvmt) [] [] []    Trendelenburg [] [] []    Kemps/Quadrant [] [] []    Stork [] [] []    NOE/Tico [] [] []    Hip scour [] [] []    Supine to sit [] [] []    Prone knee bend [] [] []           Hip thrust [] [] []    SI distraction/compression [] [x] [] painful   [place]/thrust [] [x] [] painful              [x] Patient history, allergies, meds reviewed. Medical chart reviewed. See intake form. Review Of Systems (ROS):  [x]Performed Review of systems (Integumentary, CardioPulmonary, Neurological) by intake and observation. Intake form has been scanned into medical record. Patient has been instructed to contact their primary care physician regarding ROS issues if not already being addressed at this time.       Co-morbidities/Complexities (which will affect course of rehabilitation):   []None           Arthritic conditions   []Rheumatoid arthritis (M05.9)  []Osteoarthritis (M19.91)   Cardiovascular conditions   []Hypertension (I10)  []Hyperlipidemia (E78.5)  []Angina pectoris (I20)  []Atherosclerosis (I70)  []CVA Musculoskeletal conditions   []Disc pathology   []Congenital spine pathologies   []Prior surgical intervention  []Osteoporosis (M81.8)  []Osteopenia (M85.8)   Endocrine conditions   []Hypothyroid (E03.9)  []Hyperthyroid Gastrointestinal conditions   []Constipation (S13.01)   Metabolic conditions   []Morbid obesity (E66.01)  []Diabetes type 1(E10.65) or 2 (E11.65)   []Neuropathy (G60.9)     Pulmonary conditions   []Asthma (J45)  []Coughing   []COPD (J44.9)   Psychological Disorders  []Anxiety (F41.9)  []Depression (F32.9)   []Other:   [x]Other:   internal issues        Barriers to/and or personal factors that will affect rehab potential:              []Age  []Sex    []Smoker              []Motivation/Lack of Motivation                        []Co-Morbidities              []Cognitive Function, education/learning barriers              []Environmental, home barriers              []profession/work barriers  []past PT/medical experience  []other:  Justification:     Falls Risk Assessment (30 days):  [x] Falls Risk assessed and no intervention required.   [] Falls Risk assessed and Patient requires intervention due to being higher risk   TUG score (>12s at risk):     [] Falls education provided, including:         ASSESSMENT:   Functional Impairments:     [x]Noted lumbar/proximal hip hypomobility   [x]Noted lumbosacral and/or generalized hypermobility   [x]Decreased Lumbosacral/hip/LE functional ROM   [x]Decreased core/proximal hip strength and neuromuscular control    [x]Decreased LE functional strength    [x]Abnormal reflexes/sensation/myotomal/dermatomal deficits  []Reduced balance/proprioceptive control    []other:      Functional Activity Limitations (from functional questionnaire and intake)   [x]Reduced ability to tolerate prolonged functional positions   [x]Reduced ability or difficulty with changes of positions or transfers between positions   [x]Reduced ability to maintain good posture and demonstrate good body mechanics with sitting, bending, and lifting   [x]Reduced ability to sleep   [x] Reduced ability or tolerance with driving and/or computer work   [x]Reduced ability to perform lifting, reaching, carrying tasks   [x]Reduced ability to squat   [x]Reduced ability to forward bend   [x]Reduced ability to ambulate prolonged functional periods/distances/surfaces   [x]Reduced ability to ascend/descend stairs   []other:       Participation Restrictions   []Reduced participation in self care activities   [x]Reduced participation in home management activities   [x]Reduced participation in work activities   [x]Reduced participation in social activities. [x]Reduced participation in sport/recreational activities. Classification:   []Signs/symptoms consistent with Lumbar instability/stabilization subgroup. [x]Signs/symptoms consistent with Lumbar mobilization/manipulation subgroup, myotomes and dermatomes intact. Meets manipulation criteria. [x]Signs/symptoms consistent with Lumbar direction specific/centralization subgroup   [x]Signs/symptoms consistent with Lumbar traction subgroup       [x]Signs/symptoms consistent with lumbar facet dysfunction   [x]Signs/symptoms consistent with lumbar stenosis type dysfunction   [x]Signs/symptoms consistent with nerve root involvement including myotome & dermatome dysfunction   []Signs/symptoms consistent with post-surgical status including: decreased ROM, strength and function.    []signs/symptoms consistent with pathology which may benefit from Dry needling     []other:      Prognosis/Rehab Potential:      []Excellent   []Good    []Fair   [x]Poor    Tolerance of evaluation/treatment:    []Excellent   []Good    []Fair   [x]Poor Physical Therapy Evaluation Complexity Justification  [x] A history of present problem with:  [] no personal factors and/or comorbidities that impact the plan of care;  [x]1-2 personal factors and/or comorbidities that impact the plan of care  []3 personal factors and/or comorbidities that impact the plan of care  [x] An examination of body systems using standardized tests and measures addressing any of the following: body structures and functions (impairments), activity limitations, and/or participation restrictions;:  [x] a total of 1-2 or more elements   [] a total of 3 or more elements   [] a total of 4 or more elements   [x] A clinical presentation with:  [] stable and/or uncomplicated characteristics   [x] evolving clinical presentation with changing characteristics  [] unstable and unpredictable characteristics;   [x] Clinical decision making of [] low, [] moderate, [] high complexity using standardized patient assessment instrument and/or measurable assessment of functional outcome. [x] EVAL (LOW) 06049 (typically 20 minutes face-to-face)  [] EVAL (MOD) 56990 (typically 30 minutes face-to-face)  [] EVAL (HIGH) 51535 (typically 45 minutes face-to-face)  [] RE-EVAL     PLAN: Begin PT focusing on: proximal hip mobilizations, LB mobs, LB core activation, proximal hip activation, and HEP    Frequency/Duration: 2 days per week for 9 rx: Interventions:  [x]  Therapeutic exercise including: strength training, ROM, for LE, Glutes and core   [x]  NMR activation and proprioception for glutes , LE and Core   [x]  Manual therapy as indicated for Hip complex, LE and spine to include: Dry Needling/IASTM, STM, PROM, Gr I-IV mobilizations, manipulation. [x]  Modalities as needed that may include: thermal agents, E-stim, Biofeedback, US, iontophoresis as indicated  [x]  Patient education on joint protection, postural re-education, activity modification, progression of HEP.     HEP instruction: (see scanned forms)    GOALS:  Patient stated goal: \" I want to function without pain \"  [] Progressing: [] Met: [] Not Met: [] Adjusted  Therapist goals for Patient:   Short Term Goals: To be achieved in: 2 weeks  1. Independent in HEP and progression per patient tolerance, in order to prevent re-injury. [] Progressing: [] Met: [] Not Met: [] Adjusted  2. Patient will have a decrease in pain to facilitate improvement in movement, function, and ADLs as indicated by Functional Deficits. [] Progressing: [] Met: [] Not Met: [] Adjusted    Long Term Goals: To be achieved in: 8 weeks  1. Disability index score of 25% or less for the SHARAD to assist with reaching prior level of function. [] Progressing: [] Met: [] Not Met: [] Adjusted  2. Patient will demonstrate increased AROM to  75% WNL, good LS mobility, good hip ROM to allow for proper joint functioning as indicated by patients Functional Deficits. [] Progressing: [] Met: [] Not Met: [] Adjusted  3. Patient will demonstrate an increase in Strength to good proximal hip and core activation to allow for proper functional mobility as indicated by patients Functional Deficits. [] Progressing: [] Met: [] Not Met: [] Adjusted  4. Patient will return to 80%  functional activities without increased symptoms or restriction. [] Progressing: [] Met: [] Not Met: [] Adjusted  5.(patient specific functional goal)    [] Progressing: [] Met: [] Not Met: [] Adjusted     Electronically signed by:  Melinda Rasmussen PT        Note: If patient does not return for scheduled/recommended follow up visits, this note will serve as a discharge from care along with the most recent update on progress.

## 2021-02-11 NOTE — FLOWSHEET NOTE
[facility]. [address]  Phone: [phone]   Fax:     [phone]    Physical Therapy Treatment Note/ Progress Report:     Date:  2021    Patient Name:  Viviana Cohen    :  1960  MRN: [id number]    Pertinent Medical History: Additional Pertinent Hx: Natural fusion of C5,C6    Medical/Treatment Diagnosis Information:  · Diagnosis: M54.5 (ICD-10-CM) - Low back pain  · Treatment Diagnosis: decreased abilty to function    Insurance/Certification information:  PT Insurance Information: [facility]  Physician Information:  Referring Practitioner: Dr. Lily Smith of care signed (Y/N): routed    Date of Patient follow up with Physician: not sure     Progress Report: []  Yes  [x]  No     Date Range for reporting period:  Beginnin2021  Ending:    Progress report due (10 Rx/or 30 days whichever is less):     Recertification due (POC duration/ or 90 days whichever is less):21    Visit # POC/ Insurance Allowable Auth Needed   1 9 per [facility] []Yes    []No     Functional Scale:       Date Assessed: at eval  Test: [place]-60  Score:     Pain level:  3-7/10     History of Injury:Patient is a 62 y/o female who was pulling a skid at [employer] and unloading them on 20. She started having  left thigh first and then back pain a few days later. She has had nothing done except for xrays since. She had an mri today. She c/o constant sharp nerve pain in her bialteral lb, hips and into her bilateral buttocks and hamstrings as well as left thigh and hip. She also has tingling in her vaginal area and tailbone. She says the pain is severe at times. She has used heating pads icy hot and some other things to help her back. She   Does  not know what makes it hurt more or feel better . She is working on light duty at this time. Denies changes in bowel or bladder.      SUBJECTIVE:  Pt states, \" They haven;t done any treatment since I was hurt back in June \"    OBJECTIVE:      ROM   Comments   Lumbar Flex wfl     Lumbar Ext 5 painful in right lb        ROM LEFT RIGHT Comments   Lumbar Side Bend 10 10 Pain in her right lb   Lumbar Rotation 50 50     Quadrant   + painful   Hip Flexion 100 100     Hip Abd 25 25     Hip ER 30 30     Hip IR 20 20     Hip Extension 5 5     Knee Ext         Knee Flex         Hamstring Flex -20 -20     Piriformis tight tight     Tanner test  + +                     RESTRICTIONS/PRECAUTIONS: She is having a lot of nerve pain that does not follow a distinct dermatome, She said the md said he is not too worried about that    Exercises/Interventions:     Therapeutic Ex (25740)   Min: Resistance/Reps Notes/Cues   Prone resting     ppt     chelly pose                                   Manual Intervention (25313) Min:               NMR re-education (61764)   Min:     Mf Activation- re-ed     TrA Re-ed activation     Glute Max re-ed activation          Therapeutic Activity (01821) Min:      Went over resting postioins, biomechanics, work positions, discussed mechanism of injury. Modalities  Min:                     HEP     dktc 10 x 10    Piriformis stretch 60 x 3    str X 3 min    bridges X 20                     Other Therapeutic Activities:  Pt was educated on PT POC, Diagnosis, Prognosis, pathomechanics as well as frequency and duration of scheduling future physical therapy appointments. Time was also taken on this day to answer all patient questions and participation in PT. Reviewed appointment policy in detail with patient and patient verbalized understanding.      Home Exercise Program: Patient demonstrated proper technique, good tolerance,  and was given written instructions for the above exercises      Therapeutic Exercise and NMR EXR  [x] (22696) Provided verbal/tactile cueing for activities related to strengthening, flexibility, RE-EVAL     [x] FV(95758) x  1   [] Dry needle 1 or 2 Muscles (44429)  [] NMR (78483) x   [] Dry needle 3+ Muscles (84109)  [] Manual (68722) x     [] Ultrasound (82000) x  [x] TA (55089) x1     [] Mech Traction (75586)  [] ES(attended) (00734)     [] ES (un) (76681):   [] Vasopump (32684) [] Ionto (78212)   [] Other:  GOALS:  Patient stated goal: \" I want to function without pain \"  []? Progressing: []? Met: []? Not Met: []? Adjusted  Therapist goals for Patient:   Short Term Goals: To be achieved in: 2 weeks  1. Independent in HEP and progression per patient tolerance, in order to prevent re-injury. []? Progressing: []? Met: []? Not Met: []? Adjusted  2. Patient will have a decrease in pain to facilitate improvement in movement, function, and ADLs as indicated by Functional Deficits. []? Progressing: []? Met: []? Not Met: []? Adjusted     Long Term Goals: To be achieved in: 8 weeks  1. Disability index score of 25% or less for the SHARAD to assist with reaching prior level of function. []? Progressing: []? Met: []? Not Met: []? Adjusted  2. Patient will demonstrate increased AROM to  75% WNL, good LS mobility, good hip ROM to allow for proper joint functioning as indicated by patients Functional Deficits. []? Progressing: []? Met: []? Not Met: []? Adjusted  3. Patient will demonstrate an increase in Strength to good proximal hip and core activation to allow for proper functional mobility as indicated by patients Functional Deficits. []? Progressing: []? Met: []? Not Met: []? Adjusted  4. Patient will return to 80%  functional activities without increased symptoms or restriction. []? Progressing: []? Met: []? Not Met: []? Adjusted  5.(patient specific functional goal)    []? Progressing: []? Met: []? Not Met: []?  Adjusted              ASSESSMENT:  See eval    Treatment/Activity Tolerance:  [x] Patient tolerated treatment well [] Patient limited by fatique  [] Patient limited by pain  [] Patient limited by other medical complications  [] Other:     Overall Progression Towards Functional goals/ Treatment Progress Update:  [] Patient is progressing as expected towards functional goals listed. [] Progression is slowed due to complexities/Impairments listed. [] Progression has been slowed due to co-morbidities. [x] Plan just implemented, too soon to assess goals progression <30days   [] Goals require adjustment due to lack of progress  [] Patient is not progressing as expected and requires additional follow up with physician  [] Other:    Prognosis for POC: [] Good [] Fair  [x] Poor    Patient requires continued skilled intervention: [x] Yes  [] No        PLAN: Lumbar rom, strength, myofascial release, muscle enregy technique, joint mobs  le stretches, ns exercises, hep, modalities as needed, Most of pain is in right L4,5S1 area, Start with [address], mfr, jnt mobs to this area, She has problems with extension more then flexion    [] Continue per plan of care [] Alter current plan (see comments)  [x] Plan of care initiated [] Hold pending MD visit [] Discharge    Electronically signed by: Keyla Mccullough PT    Note: If patient does not return for scheduled/recommended follow up visits, this note will serve as a discharge from care along with the most recent update on progress.

## 2021-02-16 ENCOUNTER — HOSPITAL ENCOUNTER (OUTPATIENT)
Dept: PHYSICAL THERAPY | Age: 61
Setting detail: THERAPIES SERIES
End: 2021-02-16
Payer: COMMERCIAL

## 2021-02-18 ENCOUNTER — HOSPITAL ENCOUNTER (OUTPATIENT)
Dept: PHYSICAL THERAPY | Age: 61
Setting detail: THERAPIES SERIES
Discharge: HOME | End: 2021-02-18
Payer: COMMERCIAL

## 2021-02-18 PROCEDURE — 97110 THERAPEUTIC EXERCISES: CPT

## 2021-02-18 PROCEDURE — 97035 APP MDLTY 1+ULTRASOUND EA 15: CPT

## 2021-02-18 PROCEDURE — 97140 MANUAL THERAPY 1/> REGIONS: CPT

## 2021-02-18 NOTE — FLOWSHEET NOTE
[address] [facility] [place]. [address]  Phone: [phone]   Fax:     [phone]    Physical Therapy Treatment Note/ Progress Report:     Date:  2021    Patient Name:  Kelsie Kumar    :  1960  MRN: [id number]    Pertinent Medical History: Additional Pertinent Hx: Natural fusion of C5,C6    Medical/Treatment Diagnosis Information:  · Diagnosis: M54.5 (ICD-10-CM) - Low back pain  · Treatment Diagnosis: decreased abilty to function    Insurance/Certification information:  PT Insurance Information: Zia Danielle  Physician Information:  Referring Practitioner: Dr. Lang Ruth of care signed (Y/N): routed    Date of Patient follow up with Physician: not sure     Progress Report: []  Yes  [x]  No     Date Range for reporting period:  Beginnin2021  Ending:    Progress report due (10 Rx/or 30 days whichever is less):     Recertification due (POC duration/ or 90 days whichever is less):21    Visit # POC/ Insurance Allowable Auth Needed   2 9 per [facility] []Yes    []No     Functional Scale:       Date Assessed: at eval  Test: [place]-60  Score:     Pain level:  3-7/10     History of Injury:Patient is a 62 y/o female who was pulling a skid at [employer] and unloading them on 20. She started having  left thigh first and then back pain a few days later. She has had nothing done except for xrays since. She had an mri today. She c/o constant sharp nerve pain in her bialteral lb, hips and into her bilateral buttocks and hamstrings as well as left thigh and hip. She also has tingling in her vaginal area and tailbone. She says the pain is severe at times. She has used heating pads icy hot and some other things to help her back. She   Does  not know what makes it hurt more or feel better . She is working on light duty at this time. Denies changes in bowel or bladder.      SUBJECTIVE:  Pt states, \" They haven;t done any treatment since I was hurt back in June \"  2/18/21  Pt states, \" I am not quite as sore this week, still having a lot of nerve pain \"    OBJECTIVE:      ROM   Comments   Lumbar Flex wfl     Lumbar Ext 5 painful in right lb        ROM LEFT RIGHT Comments   Lumbar Side Bend 10 10 Pain in her right lb   Lumbar Rotation 50 50     Quadrant   + painful   Hip Flexion 100 100     Hip Abd 25 25     Hip ER 30 30     Hip IR 20 20     Hip Extension 5 5     Knee Ext         Knee Flex         Hamstring Flex -20 -20     Piriformis tight tight     Tanner test  + +                     RESTRICTIONS/PRECAUTIONS: She is having a lot of nerve pain that does not follow a distinct dermatome, She said the md said he is not too worried about that    Exercises/Interventions:     Therapeutic Ex (23073)   Min: Resistance/Reps Notes/Cues   Prone resting     ppt     chelly pose                                   Manual Intervention (36773) Min:30      Mfr to bilateral lumbar, yadira, itb, gr 3 PA's to lower thor and lumbar x 30 min         NMR re-education (81688)   Min:     Mf Activation- re-ed     TrA Re-ed activation     Glute Max re-ed activation          Therapeutic Activity (21973) Min:      Went over resting postioins, biomechanics, work positions, discussed mechanism of injury. Modalities  Min:      US US 1.5 cm2 to bilat lumbar yadira and SI x 10 min              HEP  Reviewed all and corrected technique. Told her to cease this due to le tingling   Piriformis stretch 60 x 3    str X 3 min    bridges X 20    clams X 30 Added 2/18/21               Other Therapeutic Activities:  Pt was educated on PT POC, Diagnosis, Prognosis, pathomechanics as well as frequency and duration of scheduling future physical therapy appointments. Time was also taken on this day to answer all patient questions and participation in PT.  Reviewed appointment policy in detail with patient and patient verbalized understanding. Home Exercise Program: Patient demonstrated proper technique, good tolerance,  and was given written instructions for the above exercises      Therapeutic Exercise and NMR EXR  [x] (42530) Provided verbal/tactile cueing for activities related to strengthening, flexibility, endurance, ROM  for improvements in proximal hip and core control with self care, mobility, lifting and ambulation.  [] (22301) Provided verbal/tactile cueing for activities related to improving balance, coordination, kinesthetic sense, posture, motor skill, proprioception  to assist with core control in self care, mobility, lifting, and ambulation.      Therapeutic Activities:    [x] (96945 or 78807) Provided verbal/tactile cueing for activities related to improving balance, coordination, kinesthetic sense, posture, motor skill, proprioception and motor activation to allow for proper function  with self care and ADLs  [] (96121) Provided training and instruction to the patient for proper core and proximal hip recruitment and positioning with ambulation re-education     Home Exercise Program:    [x] (94830) Reviewed/Progressed HEP activities related to strengthening, flexibility, endurance, ROM of core, proximal hip and LE for functional self-care, mobility, lifting and ambulation   [] (96705) Reviewed/Progressed HEP activities related to improving balance, coordination, kinesthetic sense, posture, motor skill, proprioception of core, proximal hip and LE for self care, mobility, lifting, and ambulation      Manual Treatments:  PROM / STM / Oscillations-Mobs:  G-I, II, III, IV (PA's, Inf., Post.)  [x] (43089) Provided manual therapy to mobilize proximal hip and LS spine soft tissue/joints for the purpose of modulating pain, promoting relaxation,  increasing ROM, reducing/eliminating soft tissue swelling/inflammation/restriction, improving soft tissue extensibility and allowing for proper ROM for normal function with self care, mobility, lifting and ambulation. [facility] TIME CODES    MODALITY                      START TIME   STOP TIME   [id number]   man 1245    100   exs 100 115              Charges:  Timed Code Treatment Minutes: 45   Total Treatment Minutes: 45     [] EVAL (LOW) 43369 (typically 20 minutes face-to-face)  [] EVAL (MOD) 33244 (typically 30 minutes face-to-face)  [] EVAL (HIGH) 72014 (typically 45 minutes face-to-face)  [] RE-EVAL     [x] RC(48032) x  1   [] Dry needle 1 or 2 Muscles (04347)  [] NMR (25512) x   [] Dry needle 3+ Muscles (77194)  [x] Manual (64081) x     [x] Ultrasound (13448) x  [] TA (88628) x1     [] Mech Traction (85330)  [] ES(attended) (66516)     [] ES (un) (90060):   [] Vasopump (29814) [] Ionto (86788)   [] Other:  GOALS:  Patient stated goal: \" I want to function without pain \"  []? Progressing: []? Met: []? Not Met: []? Adjusted  Therapist goals for Patient:   Short Term Goals: To be achieved in: 2 weeks  1. Independent in HEP and progression per patient tolerance, in order to prevent re-injury. []? Progressing: []? Met: []? Not Met: []? Adjusted  2. Patient will have a decrease in pain to facilitate improvement in movement, function, and ADLs as indicated by Functional Deficits. []? Progressing: []? Met: []? Not Met: []? Adjusted     Long Term Goals: To be achieved in: 8 weeks  1. Disability index score of 25% or less for the SHARAD to assist with reaching prior level of function. []? Progressing: []? Met: []? Not Met: []? Adjusted  2. Patient will demonstrate increased AROM to  75% WNL, good LS mobility, good hip ROM to allow for proper joint functioning as indicated by patients Functional Deficits. []? Progressing: []? Met: []? Not Met: []? Adjusted  3. Patient will demonstrate an increase in Strength to good proximal hip and core activation to allow for proper functional mobility as indicated by patients Functional Deficits. []? Progressing: []? Met: []?  Not Met: []? Adjusted  4. Patient will return to 80%  functional activities without increased symptoms or restriction. []? Progressing: []? Met: []? Not Met: []? Adjusted  5.(patient specific functional goal)    []? Progressing: []? Met: []? Not Met: []? Adjusted              ASSESSMENT:  See eval    Treatment/Activity Tolerance:  [x] Patient tolerated treatment well [] Patient limited by fatique  [] Patient limited by pain  [] Patient limited by other medical complications  [] Other:     Overall Progression Towards Functional goals/ Treatment Progress Update:  [] Patient is progressing as expected towards functional goals listed. [] Progression is slowed due to complexities/Impairments listed. [] Progression has been slowed due to co-morbidities. [x] Plan just implemented, too soon to assess goals progression <30days   [] Goals require adjustment due to lack of progress  [] Patient is not progressing as expected and requires additional follow up with physician  [] Other:    Prognosis for POC: [] Good [] Fair  [x] Poor    Patient requires continued skilled intervention: [x] Yes  [] No        PLAN: Lumbar rom, strength, myofascial release, muscle enregy technique, joint mobs  le stretches, ns exercises, hep, modalities as needed, Most of pain is in right L4,5S1 area, Start with [address], mfr, jnt mobs to this area, She has problems with extension more then flexion    [] Continue per plan of care [] Alter current plan (see comments)  [x] Plan of care initiated [] Hold pending MD visit [] Discharge    Electronically signed by: Dutch Prajapati PT    Note: If patient does not return for scheduled/recommended follow up visits, this note will serve as a discharge from care along with the most recent update on progress.

## 2021-02-23 ENCOUNTER — HOSPITAL ENCOUNTER (OUTPATIENT)
Dept: PHYSICAL THERAPY | Age: 61
Setting detail: THERAPIES SERIES
Discharge: HOME | End: 2021-02-23
Payer: COMMERCIAL

## 2021-02-23 PROCEDURE — 97140 MANUAL THERAPY 1/> REGIONS: CPT

## 2021-02-23 PROCEDURE — 97035 APP MDLTY 1+ULTRASOUND EA 15: CPT

## 2021-02-23 PROCEDURE — 97110 THERAPEUTIC EXERCISES: CPT

## 2021-02-23 NOTE — FLOWSHEET NOTE
[address] [facility] [place]. [address]  Phone: [phone]   Fax:     [phone]    Physical Therapy Treatment Note/ Progress Report:     Date:  2021    Patient Name:  Ebonie Elena    :  1960  MRN: [id number]    Pertinent Medical History: Additional Pertinent Hx: Natural fusion of C5,C6    Medical/Treatment Diagnosis Information:  · Diagnosis: M54.5 (ICD-10-CM) - Low back pain  · Treatment Diagnosis: decreased abilty to function    Insurance/Certification information:  PT Insurance Information: Denice Askew  Physician Information:  Referring Practitioner: Dr. Chai Hernandez of care signed (Y/N): routed    Date of Patient follow up with Physician: not sure     Progress Report: []  Yes  [x]  No     Date Range for reporting period:  Beginnin2021  Ending:    Progress report due (10 Rx/or 30 days whichever is less): 56    Recertification due (POC duration/ or 90 days whichever is less):21    Visit # POC/ Insurance Allowable Auth Needed   3 9 per [facility] []Yes    []No     Functional Scale:       Date Assessed: at eval  Test: [place]-60  Score:     Pain level:  3-7/10     History of Injury:Patient is a 62 y/o female who was pulling a skid at [employer] and unloading them on 20. She started having  left thigh first and then back pain a few days later. She has had nothing done except for xrays since. She had an mri today. She c/o constant sharp nerve pain in her bialteral lb, hips and into her bilateral buttocks and hamstrings as well as left thigh and hip. She also has tingling in her vaginal area and tailbone. She says the pain is severe at times. She has used heating pads icy hot and some other things to help her back. She   Does  not know what makes it hurt more or feel better . She is working on light duty at this time. Denies changes in bowel or bladder.      SUBJECTIVE:  Pt states, \" They haven;t done any treatment since I was hurt back in June \"  2/18/21  Pt states, \" I am not quite as sore this week, still having a lot of nerve pain \"  2/23/21  Pt states, \" The doctor said I have bulging discs in my back and that's whats causing the nerve pain. I have tingling in my privates, left hip and leg and my right hip and waist.  You can't stretch my back again, it hurt my neck \"    OBJECTIVE:      ROM   Comments   Lumbar Flex wfl     Lumbar Ext 5 painful in right lb        ROM LEFT RIGHT Comments   Lumbar Side Bend 10 10 Pain in her right lb   Lumbar Rotation 50 50     Quadrant   + painful   Hip Flexion 100 100     Hip Abd 25 25     Hip ER 30 30     Hip IR 20 20     Hip Extension 5 5     Knee Ext         Knee Flex         Hamstring Flex -20 -20     Piriformis tight tight     Tanner test  + +                     RESTRICTIONS/PRECAUTIONS: She is having a lot of nerve pain that does not follow a distinct dermatome, She said the md said he is not too worried about that    Exercises/Interventions:     Therapeutic Ex (13126)   Min: Resistance/Reps Notes/Cues   Nu step      Prone resting X 5 min    bridging X 30    ppt X 30    chelly pose     clams X 30                                                 Manual Intervention (90158) Min:30      Mfr to bilateral lumbar, yadira, itb, gr 3 PA's to lower thor and lumbar x 30 min         NMR re-education (78568)   Min:     Mf Activation- re-ed     TrA Re-ed activation     Glute Max re-ed activation          Therapeutic Activity (06780) Min:      Went over resting postioins, biomechanics, work positions, discussed mechanism of injury. Modalities  Min:      US US 1.5 cm2 to bilat lumbar yadira and SI x 10 min              HEP  Reviewed all and corrected technique.      Told her to cease this due to le tingling, 2/23/21   Piriformis stretch 60 x 3    str X 3 min    bridges X 20    clams X 30 Added 2/18/21               Other Therapeutic Activities:  Pt was educated on PT POC, Diagnosis, Prognosis, pathomechanics as well as frequency and duration of scheduling future physical therapy appointments. Time was also taken on this day to answer all patient questions and participation in PT. Reviewed appointment policy in detail with patient and patient verbalized understanding. Home Exercise Program: Patient demonstrated proper technique, good tolerance,  and was given written instructions for the above exercises      Therapeutic Exercise and NMR EXR  [x] (31855) Provided verbal/tactile cueing for activities related to strengthening, flexibility, endurance, ROM  for improvements in proximal hip and core control with self care, mobility, lifting and ambulation.  [] (72048) Provided verbal/tactile cueing for activities related to improving balance, coordination, kinesthetic sense, posture, motor skill, proprioception  to assist with core control in self care, mobility, lifting, and ambulation.      Therapeutic Activities:    [x] (67142 or 23361) Provided verbal/tactile cueing for activities related to improving balance, coordination, kinesthetic sense, posture, motor skill, proprioception and motor activation to allow for proper function  with self care and ADLs  [] (65188) Provided training and instruction to the patient for proper core and proximal hip recruitment and positioning with ambulation re-education     Home Exercise Program:    [x] (06893) Reviewed/Progressed HEP activities related to strengthening, flexibility, endurance, ROM of core, proximal hip and LE for functional self-care, mobility, lifting and ambulation   [] (79072) Reviewed/Progressed HEP activities related to improving balance, coordination, kinesthetic sense, posture, motor skill, proprioception of core, proximal hip and LE for self care, mobility, lifting, and ambulation      Manual Treatments:  PROM / STM / Oscillations-Mobs:  G-I, II, III, IV (PA's, Inf., Post.)  [x] (19423) Provided manual therapy to mobilize proximal hip and LS spine soft tissue/joints for the purpose of modulating pain, promoting relaxation,  increasing ROM, reducing/eliminating soft tissue swelling/inflammation/restriction, improving soft tissue extensibility and allowing for proper ROM for normal function with self care, mobility, lifting and ambulation. [facility] TIME CODES    MODALITY                      START TIME   STOP TIME   515   man 149   133    497              Charges:  Timed Code Treatment Minutes: 45   Total Treatment Minutes: 45     [] EVAL (LOW) 60951 (typically 20 minutes face-to-face)  [] EVAL (MOD) 42644 (typically 30 minutes face-to-face)  [] EVAL (HIGH) 04108 (typically 45 minutes face-to-face)  [] RE-EVAL     [x] ZW(29257) x  1   [] Dry needle 1 or 2 Muscles (98609)  [] NMR (01736) x   [] Dry needle 3+ Muscles (43368)  [x] Manual (47944) x  1   [x] Ultrasound (40919) x  [] TA (41583) x1     [] Mech Traction (39005)  [] ES(attended) (54639)     [] ES (un) (59869):   [] Vasopump (54392) [] Ionto (58497)   [] Other:  GOALS:  Patient stated goal: \" I want to function without pain \"  []? Progressing: []? Met: []? Not Met: []? Adjusted  Therapist goals for Patient:   Short Term Goals: To be achieved in: 2 weeks  1. Independent in HEP and progression per patient tolerance, in order to prevent re-injury. []? Progressing: []? Met: []? Not Met: []? Adjusted  2. Patient will have a decrease in pain to facilitate improvement in movement, function, and ADLs as indicated by Functional Deficits. []? Progressing: []? Met: []? Not Met: []? Adjusted     Long Term Goals: To be achieved in: 8 weeks  1. Disability index score of 25% or less for the SHARAD to assist with reaching prior level of function. []? Progressing: []? Met: []? Not Met: []? Adjusted  2.  Patient will demonstrate increased AROM to  75% WNL, good LS mobility, good hip ROM to allow for proper joint functioning as indicated by patients Functional Deficits. []? Progressing: []? Met: []? Not Met: []? Adjusted  3. Patient will demonstrate an increase in Strength to good proximal hip and core activation to allow for proper functional mobility as indicated by patients Functional Deficits. []? Progressing: []? Met: []? Not Met: []? Adjusted  4. Patient will return to 80%  functional activities without increased symptoms or restriction. []? Progressing: []? Met: []? Not Met: []? Adjusted  5.(patient specific functional goal)    []? Progressing: []? Met: []? Not Met: []? Adjusted              ASSESSMENT:  See eval    Treatment/Activity Tolerance:  [x] Patient tolerated treatment well [] Patient limited by fatique  [] Patient limited by pain  [] Patient limited by other medical complications  [] Other:     Overall Progression Towards Functional goals/ Treatment Progress Update:  [] Patient is progressing as expected towards functional goals listed. [] Progression is slowed due to complexities/Impairments listed. [] Progression has been slowed due to co-morbidities.   [x] Plan just implemented, too soon to assess goals progression <30days   [] Goals require adjustment due to lack of progress  [] Patient is not progressing as expected and requires additional follow up with physician  [] Other:    Prognosis for POC: [] Good [] Fair  [x] Poor    Patient requires continued skilled intervention: [x] Yes  [] No        PLAN: Lumbar rom, strength, myofascial release, muscle enregy technique, joint mobs  le stretches, ns exercises, hep, modalities as needed, Most of pain is in right L4,5S1 area, Start with [address], mfr, jnt mobs to this area, She has problems with extension more then flexion    [] Continue per plan of care [] Alter current plan (see comments)  [x] Plan of care initiated [] Hold pending MD visit [] Discharge    Electronically signed by: Ruthy Purcell PT    Note: If patient does not return for scheduled/recommended follow up visits, this note will serve as a discharge from care along with the most recent update on progress.

## 2021-02-25 ENCOUNTER — HOSPITAL ENCOUNTER (OUTPATIENT)
Dept: PHYSICAL THERAPY | Age: 61
Setting detail: THERAPIES SERIES
Discharge: HOME | End: 2021-02-25
Payer: COMMERCIAL

## 2021-02-25 PROCEDURE — 97012 MECHANICAL TRACTION THERAPY: CPT

## 2021-02-25 PROCEDURE — 97110 THERAPEUTIC EXERCISES: CPT

## 2021-02-25 PROCEDURE — 97035 APP MDLTY 1+ULTRASOUND EA 15: CPT

## 2021-02-25 PROCEDURE — 97140 MANUAL THERAPY 1/> REGIONS: CPT

## 2021-02-25 NOTE — FLOWSHEET NOTE
SUBJECTIVE:  Pt states, \" They haven;t done any treatment since I was hurt back in June \"  2/18/21  Pt states, \" I am not quite as sore this week, still having a lot of nerve pain \"  2/23/21  Pt states, \" The doctor said I have bulging discs in my back and that's whats causing the nerve pain. I have tingling in my privates, left hip and leg and my right hip and waist.  You can't stretch my back again, it hurt my neck \"  2/25/21  Pt states, \" I didn't work today and I'm not as bad, yesterday I was really bad.   It gets so bad on the left that it's hard to stand, getting shooting pain in my waist on both sides and a lot of tingling \"  'it gets so bad I have to grab it \" \"    OBJECTIVE:      ROM   Comments   Lumbar Flex wfl     Lumbar Ext 5 painful in right lb        ROM LEFT RIGHT Comments   Lumbar Side Bend 10 10 Pain in her right lb   Lumbar Rotation 50 50     Quadrant   + painful   Hip Flexion 100 100     Hip Abd 25 25     Hip ER 30 30     Hip IR 20 20     Hip Extension 5 5     Knee Ext         Knee Flex         Hamstring Flex -20 -20     Piriformis tight tight     Tanner test  + +                     RESTRICTIONS/PRECAUTIONS: She is having a lot of nerve pain that does not follow a distinct dermatome, She said the md said he is not too worried about that    Exercises/Interventions:     Therapeutic Ex (22300)   Min: Resistance/Reps Notes/Cues   Nu step      Prone resting X 5 min    bridging X 30    ppt  with ball X 30 Make sure flexion exercises are not causing tingling, will try Anny exs to see if it helps tingling    chelly pose 30 x 3    clams X 30    birddog X 20 ea    pilates press     saw     Standing hip flex      Sl abd     Prone rest     Prone press               Manual Intervention ([id number]) Min:15      Mfr to bilateral lumbar, yadira, itb, gr 3 PA's to lower thor and lumbar x15         NMR re-education (71911)   Min:     Mf Activation- re-ed     TrA Re-ed activation     Glute Max re-ed activation          Therapeutic Activity (93566) Min:      Went over resting postioins, biomechanics, work positions, discussed mechanism of injury. Modalities  Min:      US US 1.5 cm2 to bilat lumbar yadira and SI x 10 min    Pelvic traction 35#/10#  60/20 x 15 min         HEP  Reviewed all and corrected technique. Told her to cease this due to le tingling, 2/23/21   Piriformis stretch 60 x 3    str X 3 min    bridges X 20    clams X 30 Added 2/18/21               Other Therapeutic Activities:  Pt was educated on PT POC, Diagnosis, Prognosis, pathomechanics as well as frequency and duration of scheduling future physical therapy appointments. Time was also taken on this day to answer all patient questions and participation in PT. Reviewed appointment policy in detail with patient and patient verbalized understanding. Home Exercise Program: Patient demonstrated proper technique, good tolerance,  and was given written instructions for the above exercises      Therapeutic Exercise and NMR EXR  [x] (41512) Provided verbal/tactile cueing for activities related to strengthening, flexibility, endurance, ROM  for improvements in proximal hip and core control with self care, mobility, lifting and ambulation.  [] (27960) Provided verbal/tactile cueing for activities related to improving balance, coordination, kinesthetic sense, posture, motor skill, proprioception  to assist with core control in self care, mobility, lifting, and ambulation.      Therapeutic Activities:    [x] (66979 or 83420) Provided verbal/tactile cueing for activities related to improving balance, coordination, kinesthetic sense, posture, motor skill, proprioception and motor activation to allow for proper function  with self care and ADLs  [] (87645) Provided training and instruction to the patient for proper core and proximal hip recruitment and positioning with ambulation re-education     Home Exercise Program:    [x] (25263) Reviewed/Progressed HEP activities related to strengthening, flexibility, endurance, ROM of core, proximal hip and LE for functional self-care, mobility, lifting and ambulation   [] (49731) Reviewed/Progressed HEP activities related to improving balance, coordination, kinesthetic sense, posture, motor skill, proprioception of core, proximal hip and LE for self care, mobility, lifting, and ambulation      Manual Treatments:  PROM / STM / Oscillations-Mobs:  G-I, II, III, IV (PA's, Inf., Post.)  [x] (57806) Provided manual therapy to mobilize proximal hip and LS spine soft tissue/joints for the purpose of modulating pain, promoting relaxation,  increasing ROM, reducing/eliminating soft tissue swelling/inflammation/restriction, improving soft tissue extensibility and allowing for proper ROM for normal function with self care, mobility, lifting and ambulation. [facility] TIME CODES    MODALITY                      START TIME   STOP TIME   515   man 631   325    877   ptx [phone]         Charges:  Timed Code Treatment Minutes: 45   Total Treatment Minutes: 60     [] EVAL (LOW) 30479 (typically 20 minutes face-to-face)  [] EVAL (MOD) 99565 (typically 30 minutes face-to-face)  [] EVAL (HIGH) 38001 (typically 45 minutes face-to-face)  [] RE-EVAL     [x] LT(29145) x  1   [] Dry needle 1 or 2 Muscles (38509)  [] NMR (83257) x   [] Dry needle 3+ Muscles (66831)  [x] Manual (28631) x  1   [x] Ultrasound (48153) x  [] TA (42002) x1     [x] Mech Traction (04611)  [] ES(attended) (24418)     [] ES (un) (57738):   [] Vasopump (84348) [] Ionto (30339)   [] Other:  GOALS:  Patient stated goal: \" I want to function without pain \"  []? Progressing: []? Met: []? Not Met: []? Adjusted  Therapist goals for Patient:   Short Term Goals: To be achieved in: 2 weeks  1. Independent in HEP and progression per patient tolerance, in order to prevent re-injury. []? Progressing: []? Met: []? Not Met: []? Adjusted  2.  Patient will have a decrease in pain to facilitate improvement in movement, function, and ADLs as indicated by Functional Deficits. []? Progressing: []? Met: []? Not Met: []? Adjusted     Long Term Goals: To be achieved in: 8 weeks  1. Disability index score of 25% or less for the SHARAD to assist with reaching prior level of function. []? Progressing: []? Met: []? Not Met: []? Adjusted  2. Patient will demonstrate increased AROM to  75% WNL, good LS mobility, good hip ROM to allow for proper joint functioning as indicated by patients Functional Deficits. []? Progressing: []? Met: []? Not Met: []? Adjusted  3. Patient will demonstrate an increase in Strength to good proximal hip and core activation to allow for proper functional mobility as indicated by patients Functional Deficits. []? Progressing: []? Met: []? Not Met: []? Adjusted  4. Patient will return to 80%  functional activities without increased symptoms or restriction. []? Progressing: []? Met: []? Not Met: []? Adjusted  5.(patient specific functional goal)    []? Progressing: []? Met: []? Not Met: []? Adjusted              ASSESSMENT:  See eval    Treatment/Activity Tolerance:  [x] Patient tolerated treatment well [] Patient limited by fatique  [] Patient limited by pain  [] Patient limited by other medical complications  [] Other:     Overall Progression Towards Functional goals/ Treatment Progress Update:  [] Patient is progressing as expected towards functional goals listed. [] Progression is slowed due to complexities/Impairments listed. [] Progression has been slowed due to co-morbidities.   [x] Plan just implemented, too soon to assess goals progression <30days   [] Goals require adjustment due to lack of progress  [] Patient is not progressing as expected and requires additional follow up with physician  [] Other:    Prognosis for POC: [] Good [] Fair  [x] Poor    Patient requires continued skilled intervention: [x] Yes  [] No        PLAN: Lumbar rom, strength, myofascial release, muscle enregy technique, joint mobs  le stretches, ns exercises, hep, modalities as needed, Most of pain is in right L4,5S1 area, Start with US, lainer, michaelt mobs to this area, She has problems with extension more then flexion, trying pelvic , try working on jordi extension next rx due to tingling everywhere and assess ptx      [] Continue per plan of care [] Alter current plan (see comments)  [x] Plan of care initiated [] Hold pending MD visit [] Discharge    Electronically signed by: Fernando Bergeron PT    Note: If patient does not return for scheduled/recommended follow up visits, this note will serve as a discharge from care along with the most recent update on progress.

## 2021-03-02 ENCOUNTER — HOSPITAL ENCOUNTER (OUTPATIENT)
Dept: PHYSICAL THERAPY | Age: 61
Setting detail: THERAPIES SERIES
Discharge: HOME | End: 2021-03-02
Payer: COMMERCIAL

## 2021-03-02 PROCEDURE — 97035 APP MDLTY 1+ULTRASOUND EA 15: CPT

## 2021-03-02 PROCEDURE — 97140 MANUAL THERAPY 1/> REGIONS: CPT

## 2021-03-02 PROCEDURE — 97110 THERAPEUTIC EXERCISES: CPT

## 2021-03-02 PROCEDURE — 97012 MECHANICAL TRACTION THERAPY: CPT

## 2021-03-02 NOTE — FLOWSHEET NOTE
[address]  Phone: [phone]   Fax:     [phone]    Physical Therapy Treatment Note/ Progress Report:     Date:  3/2/2021    Patient Name:  Roderick Gomez    :  1960  MRN: [id number]    Pertinent Medical History: Additional Pertinent Hx: Natural fusion of C5,C6    Medical/Treatment Diagnosis Information:  · Diagnosis: M54.5 (ICD-10-CM) - Low back pain  · Treatment Diagnosis: decreased abilty to function    Insurance/Certification information:  PT Insurance Information: Kendall Cervantes  Physician Information:  Referring Practitioner: Dr. Josh Hernandez of care signed (Y/N): routed    Date of Patient follow up with Physician: not sure     Progress Report: []  Yes  [x]  No     Date Range for reporting period:  Beginning: 3/2/2021  Ending:    Progress report due (10 Rx/or 30 days whichever is less): 20    Recertification due (POC duration/ or 90 days whichever is less):21    Visit # POC/ Insurance Allowable Auth Needed   4 9 per [facility] []Yes    []No     Functional Scale:       Date Assessed: at eval  Test: [place]-60  Score:     Pain level:  3-5/10     History of Injury:Patient is a 62 y/o female who was pulling a skid at [employer] and unloading them on 20. She started having  left thigh first and then back pain a few days later. She has had nothing done except for xrays since. She had an mri today. She c/o constant sharp nerve pain in her bialteral lb, hips and into her bilateral buttocks and hamstrings as well as left thigh and hip. She also has tingling in her vaginal area and tailbone. She says the pain is severe at times. She has used heating pads icy hot and some other things to help her back. She   Does  not know what makes it hurt more or feel better . She is working on light duty at this time.  Denies changes in bowel or bladder.        SUBJECTIVE: Pt states, \" They haven;t done any treatment since I was hurt back in June \"  2/18/21  Pt states, \" I am not quite as sore this week, still having a lot of nerve pain \"  2/23/21  Pt states, \" The doctor said I have bulging discs in my back and that's whats causing the nerve pain. I have tingling in my privates, left hip and leg and my right hip and waist.  You can't stretch my back again, it hurt my neck \"  2/25/21  Pt states, \" I didn't work today and I'm not as bad, yesterday I was really bad. It gets so bad on the left that it's hard to stand, getting shooting pain in my waist on both sides and a lot of tingling \"  'it gets so bad I have to grab it \" \"  03/02/21 Patient reports traction helped some,tingling in her hips and both sides of the waist still bad after work.     OBJECTIVE:      ROM   Comments   Lumbar Flex wfl     Lumbar Ext 5 painful in right lb        ROM LEFT RIGHT Comments   Lumbar Side Bend 10 10 Pain in her right lb   Lumbar Rotation 50 50     Quadrant   + painful   Hip Flexion 100 100     Hip Abd 25 25     Hip ER 30 30     Hip IR 20 20     Hip Extension 5 5     Knee Ext         Knee Flex         Hamstring Flex -20 -20     Piriformis tight tight     Tanner test  + +                     RESTRICTIONS/PRECAUTIONS: She is having a lot of nerve pain that does not follow a distinct dermatome, She said the md said he is not too worried about that    Exercises/Interventions:     Therapeutic Ex (98001)   Min: Resistance/Reps Notes/Cues   Nu step      Prone resting X 5 min    bridging X 30    ppt  with ball X 30 Make sure flexion exercises are not causing tingling, will try Anny exs to see if it helps tingling    chelly pose     clams X 30    birddog X 20 ea    pilates press     saw     Standing hip flex      Sl abd     Prone rest 3 min    Prone press               Manual Intervention ([id number]) Min:15      Mfr to bilateral lumbar, yadira, itb, gr 3 PA's to lower thor and lumbar x15         NMR and positioning with ambulation re-education     Home Exercise Program:    [x] (45485) Reviewed/Progressed HEP activities related to strengthening, flexibility, endurance, ROM of core, proximal hip and LE for functional self-care, mobility, lifting and ambulation   [] (28588) Reviewed/Progressed HEP activities related to improving balance, coordination, kinesthetic sense, posture, motor skill, proprioception of core, proximal hip and LE for self care, mobility, lifting, and ambulation      Manual Treatments:  PROM / STM / Oscillations-Mobs:  G-I, II, III, IV (PA's, Inf., Post.)  [x] (01872) Provided manual therapy to mobilize proximal hip and LS spine soft tissue/joints for the purpose of modulating pain, promoting relaxation,  increasing ROM, reducing/eliminating soft tissue swelling/inflammation/restriction, improving soft tissue extensibility and allowing for proper ROM for normal function with self care, mobility, lifting and ambulation. [facility] TIME CODES    MODALITY                      START TIME   STOP TIME   4:45   man 4:45   5:00   exs 5:00 5:15   ptx 5:15 5:30         Charges:  Timed Code Treatment Minutes: 45   Total Treatment Minutes: 60     [] EVAL (LOW) 23486 (typically 20 minutes face-to-face)  [] EVAL (MOD) 85908 (typically 30 minutes face-to-face)  [] EVAL (HIGH) 37158 (typically 45 minutes face-to-face)  [] RE-EVAL     [x] SP(34003) x  1   [] Dry needle 1 or 2 Muscles (89404)  [] NMR (55320) x   [] Dry needle 3+ Muscles (00125)  [x] Manual (05751) x  1   [x] Ultrasound (62317) x  [] TA (84611) x1     [x] Mech Traction (55910)  [] ES(attended) (38001)     [] ES (un) (00798):   [] Vasopump (30510) [] Ionto (90076)   [] Other:  GOALS:  Patient stated goal: \" I want to function without pain \"  []? Progressing: []? Met: []? Not Met: []? Adjusted  Therapist goals for Patient:   Short Term Goals: To be achieved in: 2 weeks  1.  Independent in HEP and progression per patient tolerance, in order to prevent re-injury. []? Progressing: []? Met: []? Not Met: []? Adjusted  2. Patient will have a decrease in pain to facilitate improvement in movement, function, and ADLs as indicated by Functional Deficits. []? Progressing: []? Met: []? Not Met: []? Adjusted     Long Term Goals: To be achieved in: 8 weeks  1. Disability index score of 25% or less for the SHARAD to assist with reaching prior level of function. []? Progressing: []? Met: []? Not Met: []? Adjusted  2. Patient will demonstrate increased AROM to  75% WNL, good LS mobility, good hip ROM to allow for proper joint functioning as indicated by patients Functional Deficits. []? Progressing: []? Met: []? Not Met: []? Adjusted  3. Patient will demonstrate an increase in Strength to good proximal hip and core activation to allow for proper functional mobility as indicated by patients Functional Deficits. []? Progressing: []? Met: []? Not Met: []? Adjusted  4. Patient will return to 80%  functional activities without increased symptoms or restriction. []? Progressing: []? Met: []? Not Met: []? Adjusted  5.(patient specific functional goal)    []? Progressing: []? Met: []? Not Met: []? Adjusted              ASSESSMENT:  See eval    Treatment/Activity Tolerance:  [x] Patient tolerated treatment well [] Patient limited by fatique  [] Patient limited by pain  [] Patient limited by other medical complications  [] Other:     Overall Progression Towards Functional goals/ Treatment Progress Update:  [] Patient is progressing as expected towards functional goals listed. [] Progression is slowed due to complexities/Impairments listed. [] Progression has been slowed due to co-morbidities.   [x] Plan just implemented, too soon to assess goals progression <30days   [] Goals require adjustment due to lack of progress  [] Patient is not progressing as expected and requires additional follow up with physician  [] Other:    Prognosis for POC: [] Good [] Fair  [x] Poor    Patient requires continued skilled intervention: [x] Yes  [] No        PLAN: Lumbar rom, strength, myofascial release, muscle enregy technique, joint mobs  le stretches, ns exercises, hep, modalities as needed, Most of pain is in right L4,5S1 area, Start with [address], mfr, jnt mobs to this area, She has problems with extension more then flexion, trying pelvic , try working on jordi extension next rx due to tingling everywhere and assess ptx      [] Continue per plan of care [] Alter current plan (see comments)  [x] Plan of care initiated [] Hold pending MD visit [] Discharge    Electronically signed by: Lex Lyons, LYNNE    Note: If patient does not return for scheduled/recommended follow up visits, this note will serve as a discharge from care along with the most recent update on progress.

## 2021-03-04 ENCOUNTER — HOSPITAL ENCOUNTER (OUTPATIENT)
Dept: PHYSICAL THERAPY | Age: 61
Setting detail: THERAPIES SERIES
Discharge: HOME | End: 2021-03-04
Payer: COMMERCIAL

## 2021-03-04 PROCEDURE — 97035 APP MDLTY 1+ULTRASOUND EA 15: CPT

## 2021-03-04 PROCEDURE — 97140 MANUAL THERAPY 1/> REGIONS: CPT

## 2021-03-04 PROCEDURE — 97110 THERAPEUTIC EXERCISES: CPT

## 2021-03-04 PROCEDURE — 97012 MECHANICAL TRACTION THERAPY: CPT

## 2021-03-04 NOTE — FLOWSHEET NOTE
[address] [facility] [place]. [address] Vel[address]  Phone: [phone]   Fax:     [phone]    Physical Therapy Treatment Note/ Progress Report:     Date:  3/4/2021    Patient Name:  Ale Galvan    :  1960  MRN: [id number]    Pertinent Medical History: Additional Pertinent Hx: Natural fusion of C5,C6    Medical/Treatment Diagnosis Information:  · Diagnosis: M54.5 (ICD-10-CM) - Low back pain  · Treatment Diagnosis: decreased abilty to function    Insurance/Certification information:  PT Insurance Information: Emir Mueller  Physician Information:  Referring Practitioner: Dr. Rose Guevara of care signed (Y/N): routed    Date of Patient follow up with Physician: not sure     Progress Report: []  Yes  [x]  No     Date Range for reporting period:  Beginning: 3/4/2021  Ending:    Progress report due (10 Rx/or 30 days whichever is less):     Recertification due (POC duration/ or 90 days whichever is less):21    Visit # POC/ Insurance Allowable Auth Needed   6 9 per [facility] []Yes    []No     Functional Scale:       Date Assessed: at eval  Test: [place]-60  Score:     Pain level:  3-5/10     History of Injury:Patient is a 64 y/o female who was pulling a skid at [email or web address] and unloading them on 20. She started having  left thigh first and then back pain a few days later. She has had nothing done except for xrays since. She had an mri today. She c/o constant sharp nerve pain in her bialteral lb, hips and into her bilateral buttocks and hamstrings as well as left thigh and hip. She also has tingling in her vaginal area and tailbone. She says the pain is severe at times. She has used heating pads icy hot and some other things to help her back. She   Does  not know what makes it hurt more or feel better . She is working on light duty at this time.  Denies changes in bowel or bladder.        SUBJECTIVE: Manual Intervention (99430) Min:15      Mfr to bilateral lumbar, yadira, itb, gr 3 PA's to lower thor and lumbar x15         NMR re-education (93254)   Min:     Mf Activation- re-ed     TrA Re-ed activation     Glute Max re-ed activation          Therapeutic Activity (71616) Min:      Went over resting postioins, biomechanics, work positions, discussed mechanism of injury. Modalities  Min:      US US 1.5 cm2 to bilat lumbar yadira and SI x 10 min    Pelvic traction 40#/10#  60/20 x 15 min         HEP  Reviewed all and corrected technique. Told her to cease this due to le tingling, 2/23/21   Piriformis stretch 60 x 3    str X 3 min    bridges X 20    clams X 30 Added 2/18/21               Other Therapeutic Activities:  Pt was educated on PT POC, Diagnosis, Prognosis, pathomechanics as well as frequency and duration of scheduling future physical therapy appointments. Time was also taken on this day to answer all patient questions and participation in PT. Reviewed appointment policy in detail with patient and patient verbalized understanding. Home Exercise Program: Patient demonstrated proper technique, good tolerance,  and was given written instructions for the above exercises      Therapeutic Exercise and NMR EXR  [x] (63123) Provided verbal/tactile cueing for activities related to strengthening, flexibility, endurance, ROM  for improvements in proximal hip and core control with self care, mobility, lifting and ambulation.  [] (17846) Provided verbal/tactile cueing for activities related to improving balance, coordination, kinesthetic sense, posture, motor skill, proprioception  to assist with core control in self care, mobility, lifting, and ambulation.      Therapeutic Activities:    [x] (15998 or 10218) Provided verbal/tactile cueing for activities related to improving balance, coordination, kinesthetic sense, posture, motor skill, proprioception and motor activation to allow for proper function with self care and ADLs  [] (02862) Provided training and instruction to the patient for proper core and proximal hip recruitment and positioning with ambulation re-education     Home Exercise Program:    [x] (23133) Reviewed/Progressed HEP activities related to strengthening, flexibility, endurance, ROM of core, proximal hip and LE for functional self-care, mobility, lifting and ambulation   [] (94167) Reviewed/Progressed HEP activities related to improving balance, coordination, kinesthetic sense, posture, motor skill, proprioception of core, proximal hip and LE for self care, mobility, lifting, and ambulation      Manual Treatments:  PROM / STM / Oscillations-Mobs:  G-I, II, III, IV (PA's, Inf., Post.)  [x] (68758) Provided manual therapy to mobilize proximal hip and LS spine soft tissue/joints for the purpose of modulating pain, promoting relaxation,  increasing ROM, reducing/eliminating soft tissue swelling/inflammation/restriction, improving soft tissue extensibility and allowing for proper ROM for normal function with self care, mobility, lifting and ambulation. [address] [place] [address] TIME CODES    MODALITY                      START TIME   STOP TIME  US 5:15 5:30   man 5:30   5:45   exs 5:45 6;00   ptx 6:00 6;15         Charges:  Timed Code Treatment Minutes: 45   Total Treatment Minutes: 60     [] EVAL (LOW) 42013 (typically 20 minutes face-to-face)  [] EVAL (MOD) 80075 (typically 30 minutes face-to-face)  [] EVAL (HIGH) 87443 (typically 45 minutes face-to-face)  [] RE-EVAL     [x] (21246) x  1   [] Dry needle 1 or 2 Muscles (49604)  [] NMR (58380) x   [] Dry needle 3+ Muscles (14399)  [x] Manual (12045) x  1   [x] Ultrasound (14932) x  [] TA (73103) x1     [x] Mech Traction (16833)  [] ES(attended) (14968)     [] ES (un) (28468):   [] Vasopump (26946) [] Ionto (61802)   [] Other:  GOALS:  Patient stated goal: \" I want to function without pain \"  []? Progressing: []? Met: []? Not Met: []?  Adjusted  Therapist goals for Patient:   Short Term Goals: To be achieved in: 2 weeks  1. Independent in HEP and progression per patient tolerance, in order to prevent re-injury. []? Progressing: []? Met: []? Not Met: []? Adjusted  2. Patient will have a decrease in pain to facilitate improvement in movement, function, and ADLs as indicated by Functional Deficits. []? Progressing: []? Met: []? Not Met: []? Adjusted     Long Term Goals: To be achieved in: 8 weeks  1. Disability index score of 25% or less for the SHARAD to assist with reaching prior level of function. []? Progressing: []? Met: []? Not Met: []? Adjusted  2. Patient will demonstrate increased AROM to  75% WNL, good LS mobility, good hip ROM to allow for proper joint functioning as indicated by patients Functional Deficits. []? Progressing: []? Met: []? Not Met: []? Adjusted  3. Patient will demonstrate an increase in Strength to good proximal hip and core activation to allow for proper functional mobility as indicated by patients Functional Deficits. []? Progressing: []? Met: []? Not Met: []? Adjusted  4. Patient will return to 80%  functional activities without increased symptoms or restriction. []? Progressing: []? Met: []? Not Met: []? Adjusted  5.(patient specific functional goal)    []? Progressing: []? Met: []? Not Met: []? Adjusted              ASSESSMENT:  See eval    Treatment/Activity Tolerance:  [x] Patient tolerated treatment well [] Patient limited by fatique  [] Patient limited by pain  [] Patient limited by other medical complications  [] Other:     Overall Progression Towards Functional goals/ Treatment Progress Update:  [] Patient is progressing as expected towards functional goals listed. [] Progression is slowed due to complexities/Impairments listed. [] Progression has been slowed due to co-morbidities.   [x] Plan just implemented, too soon to assess goals progression <30days   [] Goals require adjustment due to lack of progress  [] Patient is not progressing as expected and requires additional follow up with physician  [] Other:    Prognosis for POC: [] Good [] Fair  [x] Poor    Patient requires continued skilled intervention: [x] Yes  [] No        PLAN: Lumbar rom, strength, myofascial release, muscle enregy technique, joint mobs  le stretches, ns exercises, hep, modalities as needed, Most of pain is in right L4,5S1 area, Start with [address], mfr, jnt mobs to this area, She has problems with extension more then flexion, trying pelvic , try working on jordi extension next rx due to tingling everywhere and assess ptx      [] Continue per plan of care [] Alter current plan (see comments)  [x] Plan of care initiated [] Hold pending MD visit [] Discharge    Electronically signed by: Lydia Hernandez PTA    Note: If patient does not return for scheduled/recommended follow up visits, this note will serve as a discharge from care along with the most recent update on progress.

## 2021-03-08 ENCOUNTER — HOSPITAL ENCOUNTER (OUTPATIENT)
Dept: PHYSICAL THERAPY | Age: 61
Setting detail: THERAPIES SERIES
Discharge: HOME | End: 2021-03-08
Payer: COMMERCIAL

## 2021-03-08 PROCEDURE — 97012 MECHANICAL TRACTION THERAPY: CPT

## 2021-03-08 PROCEDURE — 97035 APP MDLTY 1+ULTRASOUND EA 15: CPT

## 2021-03-08 PROCEDURE — 97140 MANUAL THERAPY 1/> REGIONS: CPT

## 2021-03-08 PROCEDURE — 97110 THERAPEUTIC EXERCISES: CPT

## 2021-03-08 NOTE — FLOWSHEET NOTE
[facility] [place]. [address]  Phone: [phone]   Fax:     [phone]    Physical Therapy Treatment Note/ Progress Report:     Date:  3/8/2021    Patient Name:  Kelsie Kumar    :  1960  MRN: [id number]    Pertinent Medical History: Additional Pertinent Hx: Natural fusion of C5,C6    Medical/Treatment Diagnosis Information:  · Diagnosis: M54.5 (ICD-10-CM) - Low back pain  · Treatment Diagnosis: decreased abilty to function    Insurance/Certification information:  PT Insurance Information: Zia Danielle  Physician Information:  Referring Practitioner: Dr. Lang Ruth of care signed (Y/N): routed    Date of Patient follow up with Physician: not sure     Progress Report: []  Yes  [x]  No     Date Range for reporting period:  Beginning: 3/8/2021  Ending:    Progress report due (10 Rx/or 30 days whichever is less):     Recertification due (POC duration/ or 90 days whichever is less):21    Visit # POC/ Insurance Allowable Auth Needed   7 9 per [facility] []Yes    []No     Functional Scale:       Date Assessed: at eval  Test: [place]-60  Score:     Pain level:  5/10     History of Injury:Patient is a 64 y/o female who was pulling a skid at "[employer]" and unloading them on 20. She started having  left thigh first and then back pain a few days later. She has had nothing done except for xrays since. She had an mri today. She c/o constant sharp nerve pain in her bialteral lb, hips and into her bilateral buttocks and hamstrings as well as left thigh and hip. She also has tingling in her vaginal area and tailbone. She says the pain is severe at times. She has used heating pads icy hot and some other things to help her back. She   Does  not know what makes it hurt more or feel better . She is working on light duty at this time.  Denies changes in bowel or bladder.        SUBJECTIVE: Pt states, \" They haven;t done any treatment since I was hurt back in June \"  2/18/21  Pt states, \" I am not quite as sore this week, still having a lot of nerve pain \"  2/23/21  Pt states, \" The doctor said I have bulging discs in my back and that's whats causing the nerve pain. I have tingling in my privates, left hip and leg and my right hip and waist.  You can't stretch my back again, it hurt my neck \"  2/25/21  Pt states, \" I didn't work today and I'm not as bad, yesterday I was really bad. It gets so bad on the left that it's hard to stand, getting shooting pain in my waist on both sides and a lot of tingling \"  'it gets so bad I have to grab it \" \"  03/02/21 Patient reports traction helped some,tingling in her hips and both sides of the waist still bad after work. 03/04/21 Patient reports she still getting tingling in both legs and around waist.States driving is still tends to aggrevate her symptoms. 03/08/21 Patient reports tingling is about the same,still around the waist and in both hips.     OBJECTIVE:      ROM   Comments   Lumbar Flex wfl     Lumbar Ext 5 painful in right lb        ROM LEFT RIGHT Comments   Lumbar Side Bend 10 10 Pain in her right lb   Lumbar Rotation 50 50     Quadrant   + painful   Hip Flexion 100 100     Hip Abd 25 25     Hip ER 30 30     Hip IR 20 20     Hip Extension 5 5     Knee Ext         Knee Flex         Hamstring Flex -20 -20     Piriformis tight tight     Tanner test  + +                     RESTRICTIONS/PRECAUTIONS: She is having a lot of nerve pain that does not follow a distinct dermatome, She said the md said he is not too worried about that    Exercises/Interventions:     Therapeutic Ex (50659)   Min: Resistance/Reps Notes/Cues   Nu step      Prone resting X 5 min    bridging X 30    ppt  with ball X 30 Make sure flexion exercises are not causing tingling, will try Anny exs to see if it helps tingling    chelly pose     clams  Increases tingling   birddog X 20 ea pilates press     saw     Standing hip flex      Sl abd     Prone rest 3 min    Prone press               Manual Intervention (58988) Min:15      Mfr to bilateral lumbar, yadira, itb, gr 3 PA's to lower thor and lumbar x15         NMR re-education (78644)   Min:     Mf Activation- re-ed     TrA Re-ed activation     Glute Max re-ed activation          Therapeutic Activity (91940) Min:      Went over resting postioins, biomechanics, work positions, discussed mechanism of injury. Modalities  Min:      US US 1.5 cm2 to bilat lumbar yadira and SI x 10 min    Pelvic traction 40#/10#  60/20 x 15 min maría elena fair had to stop after 12 min due to increased tingling in her legs        HEP  Reviewed all and corrected technique. Told her to cease this due to le tingling, 2/23/21   Piriformis stretch 60 x 3    str X 3 min    bridges X 20    clams X 30 Added 2/18/21               Other Therapeutic Activities:  Pt was educated on PT POC, Diagnosis, Prognosis, pathomechanics as well as frequency and duration of scheduling future physical therapy appointments. Time was also taken on this day to answer all patient questions and participation in PT. Reviewed appointment policy in detail with patient and patient verbalized understanding. Home Exercise Program: Patient demonstrated proper technique, good tolerance,  and was given written instructions for the above exercises      Therapeutic Exercise and NMR EXR  [x] (81841) Provided verbal/tactile cueing for activities related to strengthening, flexibility, endurance, ROM  for improvements in proximal hip and core control with self care, mobility, lifting and ambulation.  [] (20887) Provided verbal/tactile cueing for activities related to improving balance, coordination, kinesthetic sense, posture, motor skill, proprioception  to assist with core control in self care, mobility, lifting, and ambulation.      Therapeutic Activities:    [x] (77920 or 97602) Provided verbal/tactile cueing for activities related to improving balance, coordination, kinesthetic sense, posture, motor skill, proprioception and motor activation to allow for proper function  with self care and ADLs  [] (19705) Provided training and instruction to the patient for proper core and proximal hip recruitment and positioning with ambulation re-education     Home Exercise Program:    [x] (79821) Reviewed/Progressed HEP activities related to strengthening, flexibility, endurance, ROM of core, proximal hip and LE for functional self-care, mobility, lifting and ambulation   [] (44564) Reviewed/Progressed HEP activities related to improving balance, coordination, kinesthetic sense, posture, motor skill, proprioception of core, proximal hip and LE for self care, mobility, lifting, and ambulation      Manual Treatments:  PROM / STM / Oscillations-Mobs:  G-I, II, III, IV (PA's, Inf., Post.)  [x] (19610) Provided manual therapy to mobilize proximal hip and LS spine soft tissue/joints for the purpose of modulating pain, promoting relaxation,  increasing ROM, reducing/eliminating soft tissue swelling/inflammation/restriction, improving soft tissue extensibility and allowing for proper ROM for normal function with self care, mobility, lifting and ambulation.         [facility] TIME CODES    MODALITY                      START TIME   STOP TIME  US 5:15 5:30   man 5:30   5:45   exs 5:45 6:00   ptx 6:00 6:15         Charges:  Timed Code Treatment Minutes: 45   Total Treatment Minutes: 60     [] EVAL (LOW) 41612 (typically 20 minutes face-to-face)  [] EVAL (MOD) 71270 (typically 30 minutes face-to-face)  [] EVAL (HIGH) 25350 (typically 45 minutes face-to-face)  [] RE-EVAL     [x] YW(93020) x  1   [] Dry needle 1 or 2 Muscles (35234)  [] NMR (05447) x   [] Dry needle 3+ Muscles (68080)  [x] Manual (94191) x  1   [x] Ultrasound (98213) x  [] TA (49409) x1     [x] Mech Traction (43090)  [] ES(attended) (44675)     [] ES (un) (72270): [] Vasopump (89166) [] Ionto (98678)   [] Other:  GOALS:  Patient stated goal: \" I want to function without pain \"  []? Progressing: []? Met: []? Not Met: []? Adjusted  Therapist goals for Patient:   Short Term Goals: To be achieved in: 2 weeks  1. Independent in HEP and progression per patient tolerance, in order to prevent re-injury. []? Progressing: []? Met: []? Not Met: []? Adjusted  2. Patient will have a decrease in pain to facilitate improvement in movement, function, and ADLs as indicated by Functional Deficits. []? Progressing: []? Met: []? Not Met: []? Adjusted     Long Term Goals: To be achieved in: 8 weeks  1. Disability index score of 25% or less for the SHARAD to assist with reaching prior level of function. []? Progressing: []? Met: []? Not Met: []? Adjusted  2. Patient will demonstrate increased AROM to  75% WNL, good LS mobility, good hip ROM to allow for proper joint functioning as indicated by patients Functional Deficits. []? Progressing: []? Met: []? Not Met: []? Adjusted  3. Patient will demonstrate an increase in Strength to good proximal hip and core activation to allow for proper functional mobility as indicated by patients Functional Deficits. []? Progressing: []? Met: []? Not Met: []? Adjusted  4. Patient will return to 80%  functional activities without increased symptoms or restriction. []? Progressing: []? Met: []? Not Met: []? Adjusted  5.(patient specific functional goal)    []? Progressing: []? Met: []? Not Met: []? Adjusted              ASSESSMENT:  See eval    Treatment/Activity Tolerance:  [x] Patient tolerated treatment well [] Patient limited by fatique  [] Patient limited by pain  [] Patient limited by other medical complications  [] Other:     Overall Progression Towards Functional goals/ Treatment Progress Update:  [] Patient is progressing as expected towards functional goals listed. [] Progression is slowed due to complexities/Impairments listed.   [] Progression has been slowed due to co-morbidities. [x] Plan just implemented, too soon to assess goals progression <30days   [] Goals require adjustment due to lack of progress  [] Patient is not progressing as expected and requires additional follow up with physician  [] Other:    Prognosis for POC: [] Good [] Fair  [x] Poor    Patient requires continued skilled intervention: [x] Yes  [] No        PLAN: Lumbar rom, strength, myofascial release, muscle enregy technique, joint mobs  le stretches, ns exercises, hep, modalities as needed, Most of pain is in right L4,5S1 area, Start with [address], mfr, jnt mobs to this area, She has problems with extension more then flexion, trying pelvic , try working on jordi extension next rx due to tingling everywhere and assess ptx      [] Continue per plan of care [] Alter current plan (see comments)  [x] Plan of care initiated [] Hold pending MD visit [] Discharge    Electronically signed by: Sharon Hinkle PTA    Note: If patient does not return for scheduled/recommended follow up visits, this note will serve as a discharge from care along with the most recent update on progress.

## 2021-03-09 ENCOUNTER — APPOINTMENT (OUTPATIENT)
Dept: PHYSICAL THERAPY | Age: 61
End: 2021-03-09
Payer: COMMERCIAL

## 2021-03-11 ENCOUNTER — HOSPITAL ENCOUNTER (EMERGENCY)
Age: 61
Discharge: HOME | End: 2021-03-11
Payer: COMMERCIAL

## 2021-03-11 ENCOUNTER — APPOINTMENT (OUTPATIENT)
Dept: PHYSICAL THERAPY | Age: 61
End: 2021-03-11
Payer: COMMERCIAL

## 2021-03-11 ENCOUNTER — HOSPITAL ENCOUNTER (OUTPATIENT)
Dept: PHYSICAL THERAPY | Age: 61
Setting detail: THERAPIES SERIES
End: 2021-03-11
Payer: COMMERCIAL

## 2021-03-11 ENCOUNTER — APPOINTMENT (OUTPATIENT)
Dept: GENERAL RADIOLOGY | Age: 61
End: 2021-03-11
Payer: COMMERCIAL

## 2021-03-11 VITALS
HEIGHT: 63 IN | DIASTOLIC BLOOD PRESSURE: 70 MMHG | OXYGEN SATURATION: 100 % | SYSTOLIC BLOOD PRESSURE: 125 MMHG | TEMPERATURE: 98.9 F | RESPIRATION RATE: 15 BRPM | WEIGHT: 133.6 LBS | BODY MASS INDEX: 23.67 KG/M2 | HEART RATE: 75 BPM

## 2021-03-11 DIAGNOSIS — R07.9 CHEST PAIN, UNSPECIFIED TYPE: ICD-10-CM
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
## 2021-03-11 DIAGNOSIS — S29.9XXA INJURY OF CHEST WALL, INITIAL ENCOUNTER: ICD-10-CM

## 2021-03-11 LAB
A/G RATIO: 1.7 (ref 1.1–2.2)
ALBUMIN SERPL-MCNC: 4.4 G/DL (ref 3.4–5)
ALP BLD-CCNC: 67 U/L (ref 40–129)
ALT SERPL-CCNC: 11 U/L (ref 10–40)
ANION GAP SERPL CALCULATED.3IONS-SCNC: 12 MMOL/L (ref 3–16)
AST SERPL-CCNC: 14 U/L (ref 15–37)
BASOPHILS ABSOLUTE: 0 K/UL (ref 0–0.2)
BASOPHILS RELATIVE PERCENT: 0.4 %
BILIRUB SERPL-MCNC: 0.4 MG/DL (ref 0–1)
BILIRUBIN URINE: NEGATIVE
BLOOD, URINE: NEGATIVE
BUN BLDV-MCNC: 5 MG/DL (ref 7–20)
CALCIUM SERPL-MCNC: 9.1 MG/DL (ref 8.3–10.6)
CHLORIDE BLD-SCNC: 107 MMOL/L (ref 99–110)
CLARITY: CLEAR
CO2: 25 MMOL/L (ref 21–32)
COLOR: YELLOW
CREAT SERPL-MCNC: <0.5 MG/DL (ref 0.6–1.1)
D DIMER: <200 NG/ML DDU (ref 0–229)
EKG ATRIAL RATE: 83 BPM
EKG DIAGNOSIS: NORMAL
EKG P AXIS: 13 DEGREES
EKG P-R INTERVAL: 132 MS
EKG Q-T INTERVAL: 382 MS
EKG QRS DURATION: 82 MS
EKG QTC CALCULATION (BAZETT): 448 MS
EKG R AXIS: 20 DEGREES
EKG T AXIS: 30 DEGREES
EKG VENTRICULAR RATE: 83 BPM
EOSINOPHILS ABSOLUTE: 0 K/UL (ref 0–0.6)
EOSINOPHILS RELATIVE PERCENT: 0.2 %
GFR AFRICAN AMERICAN: >60
GFR NON-AFRICAN AMERICAN: >60
GLOBULIN: 2.6 G/DL
GLUCOSE BLD-MCNC: 112 MG/DL (ref 70–99)
GLUCOSE URINE: NEGATIVE MG/DL
HCT VFR BLD CALC: 40 % (ref 36–48)
HEMOGLOBIN: 13.8 G/DL (ref 12–16)
KETONES, URINE: NEGATIVE MG/DL
LEUKOCYTE ESTERASE, URINE: NEGATIVE
LIPASE: 33 U/L (ref 13–60)
LYMPHOCYTES ABSOLUTE: 1.1 K/UL (ref 1–5.1)
LYMPHOCYTES RELATIVE PERCENT: 20.6 %
MCH RBC QN AUTO: 32.2 PG (ref 26–34)
MCHC RBC AUTO-ENTMCNC: 34.5 G/DL (ref 31–36)
MCV RBC AUTO: 93.3 FL (ref 80–100)
MICROSCOPIC EXAMINATION: NORMAL
MONOCYTES ABSOLUTE: 0.3 K/UL (ref 0–1.3)
MONOCYTES RELATIVE PERCENT: 4.9 %
NEUTROPHILS ABSOLUTE: 4 K/UL (ref 1.7–7.7)
NEUTROPHILS RELATIVE PERCENT: 73.9 %
NITRITE, URINE: NEGATIVE
PDW BLD-RTO: 12.7 % (ref 12.4–15.4)
PH UA: 6.5 (ref 5–8)
PLATELET # BLD: 197 K/UL (ref 135–450)
PMV BLD AUTO: 8.5 FL (ref 5–10.5)
POTASSIUM REFLEX MAGNESIUM: 3.7 MMOL/L (ref 3.5–5.1)
PRO-BNP: 36 PG/ML (ref 0–124)
PROTEIN UA: NEGATIVE MG/DL
RBC # BLD: 4.29 M/UL (ref 4–5.2)
SODIUM BLD-SCNC: 144 MMOL/L (ref 136–145)
SPECIFIC GRAVITY UA: 1.01 (ref 1–1.03)
TOTAL PROTEIN: 7 G/DL (ref 6.4–8.2)
TROPONIN: <0.01 NG/ML
URINE REFLEX TO CULTURE: NORMAL
URINE TYPE: NORMAL
UROBILINOGEN, URINE: 0.2 E.U./DL
WBC # BLD: 5.4 K/UL (ref 4–11)

## 2021-03-11 PROCEDURE — 81003 URINALYSIS AUTO W/O SCOPE: CPT

## 2021-03-11 PROCEDURE — 83690 ASSAY OF LIPASE: CPT

## 2021-03-11 PROCEDURE — 85379 FIBRIN DEGRADATION QUANT: CPT

## 2021-03-11 PROCEDURE — 83880 ASSAY OF NATRIURETIC PEPTIDE: CPT

## 2021-03-11 PROCEDURE — 93010 ELECTROCARDIOGRAM REPORT: CPT | Performed by: INTERNAL MEDICINE

## 2021-03-11 PROCEDURE — 93005 ELECTROCARDIOGRAM TRACING: CPT | Performed by: NURSE PRACTITIONER

## 2021-03-11 PROCEDURE — 85025 COMPLETE CBC W/AUTO DIFF WBC: CPT

## 2021-03-11 PROCEDURE — 80053 COMPREHEN METABOLIC PANEL: CPT

## 2021-03-11 PROCEDURE — 84484 ASSAY OF TROPONIN QUANT: CPT

## 2021-03-11 PROCEDURE — 2580000003 HC RX 258: Performed by: NURSE PRACTITIONER

## 2021-03-11 PROCEDURE — 99283 EMERGENCY DEPT VISIT LOW MDM: CPT

## 2021-03-11 PROCEDURE — 71101 X-RAY EXAM UNILAT RIBS/CHEST: CPT

## 2021-03-11 PROCEDURE — 6370000000 HC RX 637 (ALT 250 FOR IP): Performed by: NURSE PRACTITIONER

## 2021-03-11 RX ORDER — METHOCARBAMOL 500 MG/1
500 TABLET, FILM COATED ORAL 2 TIMES DAILY PRN
Qty: 20 TABLET | Refills: 0 | Status: SHIPPED | OUTPATIENT
Start: 2021-03-11 | End: 2021-03-21

## 2021-03-11 RX ORDER — LIDOCAINE 4 G/G
1 PATCH TOPICAL ONCE
Status: DISCONTINUED | OUTPATIENT
Start: 2021-03-11 | End: 2021-03-11 | Stop reason: HOSPADM

## 2021-03-11 RX ORDER — ACETAMINOPHEN 500 MG
1000 TABLET ORAL 4 TIMES DAILY PRN
Qty: 60 TABLET | Refills: 0 | Status: SHIPPED | OUTPATIENT
Start: 2021-03-11

## 2021-03-11 RX ORDER — 0.9 % SODIUM CHLORIDE 0.9 %
1000 INTRAVENOUS SOLUTION INTRAVENOUS ONCE
Status: COMPLETED | OUTPATIENT
Start: 2021-03-11 | End: 2021-03-11

## 2021-03-11 RX ORDER — LIDOCAINE 4 G/G
1 PATCH TOPICAL DAILY
Qty: 30 PATCH | Refills: 0 | Status: SHIPPED | OUTPATIENT
Start: 2021-03-11 | End: 2021-04-10

## 2021-03-11 RX ADMIN — SODIUM CHLORIDE 1000 ML: 9 INJECTION, SOLUTION INTRAVENOUS at 11:48

## 2021-03-11 ASSESSMENT — PAIN SCALES - GENERAL: PAINLEVEL_OUTOF10: 2

## 2021-03-11 ASSESSMENT — PAIN - FUNCTIONAL ASSESSMENT: PAIN_FUNCTIONAL_ASSESSMENT: 0-10

## 2021-03-11 ASSESSMENT — PAIN DESCRIPTION - LOCATION: LOCATION: CHEST

## 2021-03-11 NOTE — ED PROVIDER NOTES
[address]  [address] [address]  Dept: [phone]  Loc: [address] ENCOUNTER        This patient was not seen or evaluated by the attending physician. I evaluated this patient, the attending physician was available for consultation. CHIEF COMPLAINT    Chief Complaint   Patient presents with    Fall     pt states while at [facility] yesterday, slipped on \"some kind of slippery product on floor\" and caught herself before falling to ground, fell forward instead into pole with fire extinguisher which hit her on left side of chest; c/o left-sided chest discomfort; denies any head injury, LOC or any other injuries from incident       HPI    Joel De Dios is a 63 y.o. female who presents with chest pain. Onset was last night. The duration has been intermittent since the onset. The quality of the pain is sharp. The pain is localized in the left chest wall. Severity is 8/10. The pain is aggravated by respirations. The pain is not associated with a cough. Is that certain movements and deep breaths do aggravate the pain. Rest somewhat alleviates the pain. The context is that as the patient was at [facility], slipped on the floor and fell into a pole catching herself. She states that she hit the pole into her left chest wall. Has has had pain in her left chest where she hit the pole ever since. She states that \"I do not understand it hurts when I move and when I take a deep breath too. \"  The patient denies any associated radiation of the pain, vomiting, diaphoresis, or increased pain with exertion. Denies any significant medical or surgical history. No family history of any cardiac risk factors. Came to the ED for further evaluation and treatment.     REVIEW OF SYSTEMS    Cardiac: see HPI, No syncope  Respiratory: no cough or sputum production, No hemoptysis  GI: No Vomiting or Diarrhea  General: No or organization: None     Attends meetings of clubs or organizations: None     Relationship status: None    Intimate partner violence     Fear of current or ex partner: None     Emotionally abused: None     Physically abused: None     Forced sexual activity: None   Other Topics Concern    None   Social History Narrative    None     Family History   Problem Relation Age of Onset    Cancer Sister 61        uterine       PHYSICAL EXAM    VITAL SIGNS: /78   Pulse 90   Temp 98.9 °F (37.2 °C) (Oral)   Resp 17   Ht 5' 3\" (1.6 m)   Wt 133 lb 9.6 oz (60.6 kg)   SpO2 99%   BMI 23.67 kg/m²    Constitutional:  Well developed, well nourished, no acute distress   HENT:  Atraumatic, moist mucus membranes  Neck: supple, no JVD   Respiratory:  Lungs clear to auscultation bilaterally, no retractions  Cardiovascular:  regular rate, no murmurs, rubs or gallops. Reproducible left chest wall tenderness just proximal to the left breast and left axilla region. Vascular: Radial and DP pulses 2+ and equal bilaterally  GI:  Soft, nontender, normal bowel sounds  Musculoskeletal:  no acute deformities, no lower extremity edema, no lower extremity asymmetry, no calf tenderness, no thigh tenderness  Integument:  Skin warm and dry, no petechiae   Neurologic:  Alert & oriented, no slurred speech  Psych: Pleasant affect, no hallucinations    EKG    Please see the physician note for EKG interpretation. RADIOLOGY/PROCEDURES    XR RIBS LEFT INCLUDE CHEST (MIN 3 VIEWS)   Final Result   No displaced rib fracture or acute process in the chest.             ED COURSE & MEDICAL DECISION MAKING    Pertinent tests interpreted. (See chart for details)  See chart for details of medications given during the ED stay.     Vitals:    03/11/21 1026   BP: 138/78   Pulse: 90   Resp: 17   Temp: 98.9 °F (37.2 °C)   TempSrc: Oral   SpO2: 99%   Weight: 133 lb 9.6 oz (60.6 kg)   Height: 5' 3\" (1.6 m)       Differential Diagnosis: URI, Musculoskeletal chest pain, Pleurisy, Pulmonary edema, Congestive Heart Failure, ACS, Pulmonary Embolus, Thoracic Dissection, Pericarditis, Pericardial Effusion, Pneumonia, Pneumothorax, Anxiety, GERD, arrhythmia, electrolyte derangement, anemia, other    CRITICAL CARE NOTE:  There was a high probability of clinically significant life-threatening deterioration of the patient's condition requiring my urgent intervention. Total critical care time was at least 15 minutes. This includes vital sign monitoring, pulse oximetry monitoring, telemetry monitoring, clinical response to the IV medications, reviewing the nursing notes, consultation time, dictation/documentation time, and interpretation of the labwork. This excludes any separately billable procedures performed. Patient is afebrile and nontoxic in appearance. Patient's pain does not appear cardiac in etiology. It did start after a injury to her chest wall. Worsens with certain movements and deep breaths and somewhat alleviates with rest.  Is reproducible on exam.  No crepitus, ecchymosis or deformities noted in the left chest wall. However the patient appears very anxious in nature, states that \"I just do not want this to be my heart. \"  Therefore I will do a cardiac work-up to rule out any cardiac or ACS etiology of her pain. He was placed on a telemetry monitor throughout her entire ED course. Labs reveal no leukocytosis or anemia. Metabolic panel unremarkable. CXR findings as above. EKG interpreted by physician. Troponin negative. ddimer negative. Patient is also in the low risk group per the [facility]' Criteria for Pulmonary Embolism: no clinical signs or symptoms of DVT, PE is not the #1 most likely diagnosis, heart rate <100, patient not immobilized for 3 days, no surgery the previous 4 weeks, no previously diagnosed PE or DVT, no hemoptysis, no treated malignancy within 6 months, not in palliative care for malignancy.     Patient's HEART score is 1 d/t Value Ref Range    Sodium 144 136 - 145 mmol/L    Potassium reflex Magnesium 3.7 3.5 - 5.1 mmol/L    Chloride 107 99 - 110 mmol/L    CO2 25 21 - 32 mmol/L    Anion Gap 12 3 - 16    Glucose 112 (H) 70 - 99 mg/dL    BUN 5 (L) 7 - 20 mg/dL    CREATININE <0.5 (L) 0.6 - 1.1 mg/dL    GFR Non-African American >60 >60    GFR African American >60 >60    Calcium 9.1 8.3 - 10.6 mg/dL    Total Protein 7.0 6.4 - 8.2 g/dL    Albumin 4.4 3.4 - 5.0 g/dL    Albumin/Globulin Ratio 1.7 1.1 - 2.2    Total Bilirubin 0.4 0.0 - 1.0 mg/dL    Alkaline Phosphatase 67 40 - 129 U/L    ALT 11 10 - 40 U/L    AST 14 (L) 15 - 37 U/L    Globulin 2.6 g/dL   Lipase   Result Value Ref Range    Lipase 33.0 13.0 - 60.0 U/L   Troponin   Result Value Ref Range    Troponin <0.01 <0.01 ng/mL   Brain Natriuretic Peptide   Result Value Ref Range    Pro-BNP 36 0 - 124 pg/mL   D-Dimer, Quantitative   Result Value Ref Range    D-Dimer, Quant <200 0 - 229 ng/mL DDU   Urinalysis Reflex to Culture    Specimen: Urine, clean catch   Result Value Ref Range    Color, UA YELLOW Straw/Yellow    Clarity, UA Clear Clear    Glucose, Ur Negative Negative mg/dL    Bilirubin Urine Negative Negative    Ketones, Urine Negative Negative mg/dL    Specific Gravity, UA 1.006 1.005 - 1.030    Blood, Urine Negative Negative    pH, UA 6.5 5.0 - 8.0    Protein, UA Negative Negative mg/dL    Urobilinogen, Urine 0.2 <2.0 E.U./dL    Nitrite, Urine Negative Negative    Leukocyte Esterase, Urine Negative Negative    Microscopic Examination Not Indicated     Urine Type NotGiven     Urine Reflex to Culture Not Indicated        I estimate there is LOW risk for PULMONARY EMBOLISM, ACUTE CORONARY SYNDROME, OR THORACIC AORTIC DISSECTION, thus I consider the discharge disposition reasonable. Joel De Dios and I have discussed the diagnosis and risks, and we agree with discharging home to follow-up with their primary doctor in 2-3 days.  We also discussed returning to the Emergency Department immediately if new or worsening symptoms occur. We have discussed the symptoms which are most concerning (e.g., bloody sputum, fever, worsening pain or shortness of breath, vomiting) that necessitate immediate return. The patient verbalized understanding, has no further questions or concerns and her agreement with this plan as well as the plan of discharge. FINAL Impression    1. Fall, initial encounter    2. Injury of chest wall, initial encounter    3. Chest pain, unspecified type        Blood pressure 138/78, pulse 90, temperature 98.9 °F (37.2 °C), temperature source Oral, resp. rate 17, height 5' 3\" (1.6 m), weight 133 lb 9.6 oz (60.6 kg), SpO2 99 %, not currently breastfeeding.        PLAN  Discharge with outpatient follow-up      (Please note that this note was completed with a voice recognition program.  Every attempt was made to edit the dictations, but inevitably there remain words that are mis-transcribed.)             WILLIAMS Donovan - CNP  03/11/21 7560

## 2021-03-11 NOTE — ED NOTES
Patient verbalized understanding of discharge instructions and follow-up care. Patient was e-scribed 3 of prescriptions and verbalized understanding of instructions for said prescriptions. Breathing regular, no distress, skin color, texture, turgor normal. No rashes or lesions, patient alert and oriented X3. Patient ambulated out of emergency department with steady gait.      Josie Faulkner RN  03/11/21 4238

## 2021-03-11 NOTE — ED NOTES
Patient requesting to speak with NP because she wants to know why she needs to draw blood.      Melrose Snellen, RN  03/11/21 0509

## 2021-03-11 NOTE — ED NOTES
Patient ambulated to and from the restroom without incident. Patient then ambulated to xray.      Jerel Day RN  03/11/21 1123

## 2021-03-11 NOTE — ED PROVIDER NOTES
I was available for consultation during patient's ED stay. Patient was cared for by MARISEL. I did not evaluate or participate in patient care.     The Ekg interpreted by me shows  Normal sinus rhythm with a rate 83, normal axis, , QRS 82, QTc 448, no ST elevations, no acute change compared EKG from 3/10/2020    Sasha Mcfarland MD  [facility]  3/11/21  12:02 PM EST         Sasha Mcfarland MD  03/11/21 1204

## 2021-03-15 ENCOUNTER — HOSPITAL ENCOUNTER (OUTPATIENT)
Dept: PHYSICAL THERAPY | Age: 61
Setting detail: THERAPIES SERIES
Discharge: HOME | End: 2021-03-15
Payer: COMMERCIAL

## 2021-03-15 PROCEDURE — 97110 THERAPEUTIC EXERCISES: CPT

## 2021-03-15 PROCEDURE — 97140 MANUAL THERAPY 1/> REGIONS: CPT

## 2021-03-15 NOTE — FLOWSHEET NOTE
[facility]  Phone: [phone]   Fax:     [phone]    Physical Therapy Treatment Note/ Progress Report:     Date:  3/15/2021    Patient Name:  Ebonie Elena    :  1960  MRN: [id number]    Pertinent Medical History: Additional Pertinent Hx: Natural fusion of C5,C6    Medical/Treatment Diagnosis Information:  · Diagnosis: M54.5 (ICD-10-CM) - Low back pain  · Treatment Diagnosis: decreased abilty to function    Insurance/Certification information:  PT Insurance Information: Denice Askew  Physician Information:  Referring Practitioner: Dr. Chai Hernandez of care signed (Y/N): routed    Date of Patient follow up with Physician: not sure     Progress Report: []  Yes  [x]  No     Date Range for reporting period:  Beginning: 3/15/2021  Ending:    Progress report due (10 Rx/or 30 days whichever is less):     Recertification due (POC duration/ or 90 days whichever is less):21    Visit # POC/ Insurance Allowable Auth Needed   8 9 per [facility] []Yes    []No     Functional Scale:       Date Assessed: at eval  Test: [place]-60  Score:     Pain level:  5/10     History of Injury:Patient is a 64 y/o female who was pulling a skid at [employer] and unloading them on 20. She started having  left thigh first and then back pain a few days later. She has had nothing done except for xrays since. She had an mri today. She c/o constant sharp nerve pain in her bialteral lb, hips and into her bilateral buttocks and hamstrings as well as left thigh and hip. She also has tingling in her vaginal area and tailbone. She says the pain is severe at times. She has used heating pads icy hot and some other things to help her back. She   Does  not know what makes it hurt more or feel better . She is working on light duty at this time.  Denies changes in bowel or bladder.        SUBJECTIVE: Pt states, \" They haven;t done any treatment since I was hurt back in June \"  2/18/21  Pt states, \" I am not quite as sore this week, still having a lot of nerve pain \"  2/23/21  Pt states, \" The doctor said I have bulging discs in my back and that's whats causing the nerve pain. I have tingling in my privates, left hip and leg and my right hip and waist.  You can't stretch my back again, it hurt my neck \"  2/25/21  Pt states, \" I didn't work today and I'm not as bad, yesterday I was really bad. It gets so bad on the left that it's hard to stand, getting shooting pain in my waist on both sides and a lot of tingling \"  'it gets so bad I have to grab it \" \"  03/02/21 Patient reports traction helped some,tingling in her hips and both sides of the waist still bad after work. 03/04/21 Patient reports she still getting tingling in both legs and around waist.States driving is still tends to aggrevate her symptoms. 03/08/21 Patient reports tingling is about the same,still around the waist and in both hips. 3/15/21 Pt reports pain and numbness are about the same. OBJECTIVE: 3/15/21 TLS spine range of motion all ranges wfl's no reports of increased pain, Quadrant test L/R each increase ipsilat. LBP. .     ROM   Comments   Lumbar Flex wfl     Lumbar Ext 5 painful in right lb        ROM LEFT RIGHT Comments   Lumbar Side Bend 10 10 Pain in her right lb   Lumbar Rotation 50 50     Quadrant   + painful   Hip Flexion 100 100     Hip Abd 25 25     Hip ER 30 30     Hip IR 20 20     Hip Extension 5 5     Knee Ext         Knee Flex         Hamstring Flex -20 -20     Piriformis tight tight     Tanner test  + +                     RESTRICTIONS/PRECAUTIONS: She is having a lot of nerve pain that does not follow a distinct dermatome, She said the md said he is not too worried about that    Exercises/Interventions:     Therapeutic Ex (05353)   Min: Resistance/Reps Notes/Cues   Nu step      Prone resting X 5 min         Ppt supine 5\" 20 x ea cues required to correct tech. Reported some discomfort at back and hips  Make sure flexion exercises are not causing tingling, will try Anny exs to see if it helps tingling    PPT prone  5\" 20 x     chelly pose     clams  Increases tingling   birddog X 20 ea (hip ext.) cues to train tech.     pilates press     saw     Standing hip flex      Sl abd 3\" hold 15 x L/R ea cues for training          Prone press     Prone alt   Leg lifts   Arm lifts    20 x  20 x   Reported right hip stiffness and upper back stiffness following exercise. Bridges  5\" 15 x cues for tech. Manual Intervention ([id number]) Min:15 20 min     Mfr to bilateral lumbar, yadira, itb, gr 3 PA's to lower thor and lumbar  ((PA's at L3, L2 and L1 all reproduce parethesias at buttocks))  PA to L5 and L4 tolerated well    Flexion table Flexion stretch attempted but not tolerated to mid rom              NMR re-education (68792)   Min:     Mf Activation- re-ed     TrA Re-ed activation     Glute Max re-ed activation          Therapeutic Activity (43277) Min:      Went over resting postioins, biomechanics, work positions, discussed mechanism of injury. Modalities  Min:      US     Pelvic traction  maría elena amos had to stop after 12 min due to increased tingling in her legs        HEP  Reviewed all and corrected technique. Told her to cease this due to le tingling, 2/23/21   Piriformis stretch 60 x 3    str X 3 min    bridges X 20    clams X 30 Added 2/18/21               Other Therapeutic Activities:  Pt was educated on PT POC, Diagnosis, Prognosis, pathomechanics as well as frequency and duration of scheduling future physical therapy appointments. Time was also taken on this day to answer all patient questions and participation in PT. Reviewed appointment policy in detail with patient and patient verbalized understanding.      Home Exercise Program: Patient demonstrated proper technique, good tolerance,  and was given written instructions for the above exercises      Therapeutic Exercise and NMR EXR  [x] (59647) Provided verbal/tactile cueing for activities related to strengthening, flexibility, endurance, ROM  for improvements in proximal hip and core control with self care, mobility, lifting and ambulation.  [] (76133) Provided verbal/tactile cueing for activities related to improving balance, coordination, kinesthetic sense, posture, motor skill, proprioception  to assist with core control in self care, mobility, lifting, and ambulation. Therapeutic Activities:    [x] (65069 or 95321) Provided verbal/tactile cueing for activities related to improving balance, coordination, kinesthetic sense, posture, motor skill, proprioception and motor activation to allow for proper function  with self care and ADLs  [] (27716) Provided training and instruction to the patient for proper core and proximal hip recruitment and positioning with ambulation re-education     Home Exercise Program:    [x] (99559) Reviewed/Progressed HEP activities related to strengthening, flexibility, endurance, ROM of core, proximal hip and LE for functional self-care, mobility, lifting and ambulation   [] (21337) Reviewed/Progressed HEP activities related to improving balance, coordination, kinesthetic sense, posture, motor skill, proprioception of core, proximal hip and LE for self care, mobility, lifting, and ambulation      Manual Treatments:  PROM / STM / Oscillations-Mobs:  G-I, II, III, IV (PA's, Inf., Post.)  [x] (24601) Provided manual therapy to mobilize proximal hip and LS spine soft tissue/joints for the purpose of modulating pain, promoting relaxation,  increasing ROM, reducing/eliminating soft tissue swelling/inflammation/restriction, improving soft tissue extensibility and allowing for proper ROM for normal function with self care, mobility, lifting and ambulation.         [facility] TIME CODES    MODALITY                      START TIME   STOP TIME  US     man 5:15   5:35   exs 5:35 6:05   ptx         Charges:  Timed Code Treatment Minutes: 50   Total Treatment Minutes: 55     [] EVAL (LOW) 53727 (typically 20 minutes face-to-face)  [] EVAL (MOD) 45004 (typically 30 minutes face-to-face)  [] EVAL (HIGH) 29537 (typically 45 minutes face-to-face)  [] RE-EVAL     [x] DS(82679) x  2   [] Dry needle 1 or 2 Muscles (41944)  [] NMR (02398) x   [] Dry needle 3+ Muscles (15610)  [x] Manual (06052) x  1   [] Ultrasound (57309) x  [] TA (09614) x1     [] Mech Traction (10867)  [] ES(attended) (60701)     [] ES (un) (81783):   [] Vasopump (75568) [] Ionto (05471)   [] Other:  GOALS:  Patient stated goal: \" I want to function without pain \"  []? Progressing: []? Met: []? Not Met: []? Adjusted  Therapist goals for Patient:   Short Term Goals: To be achieved in: 2 weeks  1. Independent in HEP and progression per patient tolerance, in order to prevent re-injury. []? Progressing: []? Met: []? Not Met: []? Adjusted  2. Patient will have a decrease in pain to facilitate improvement in movement, function, and ADLs as indicated by Functional Deficits. []? Progressing: []? Met: []? Not Met: []? Adjusted     Long Term Goals: To be achieved in: 8 weeks  1. Disability index score of 25% or less for the SHARAD to assist with reaching prior level of function. []? Progressing: []? Met: []? Not Met: []? Adjusted  2. Patient will demonstrate increased AROM to  75% WNL, good LS mobility, good hip ROM to allow for proper joint functioning as indicated by patients Functional Deficits. []? Progressing: []? Met: []? Not Met: []? Adjusted  3. Patient will demonstrate an increase in Strength to good proximal hip and core activation to allow for proper functional mobility as indicated by patients Functional Deficits. []? Progressing: []? Met: []? Not Met: []? Adjusted  4. Patient will return to 80%  functional activities without increased symptoms or restriction. []? Progressing: []? Met: []?  Not Met: []? Adjusted  5.(patient specific functional goal)    []? Progressing: []? Met: []? Not Met: []? Adjusted              ASSESSMENT:  See eval    Treatment/Activity Tolerance:  [x] Patient tolerated treatment well [] Patient limited by fatique  [] Patient limited by pain  [] Patient limited by other medical complications  [] Other:     Overall Progression Towards Functional goals/ Treatment Progress Update:  [] Patient is progressing as expected towards functional goals listed. [] Progression is slowed due to complexities/Impairments listed. [] Progression has been slowed due to co-morbidities. [x] Plan just implemented, too soon to assess goals progression <30days   [] Goals require adjustment due to lack of progress  [] Patient is not progressing as expected and requires additional follow up with physician  [] Other:    Prognosis for POC: [] Good [] Fair  [x] Poor    Patient requires continued skilled intervention: [x] Yes  [] No        PLAN: Pt may benefit from a Jordi progression, patient appears to be hypermobile with week core  Lumbar rom, strength, myofascial release, muscle enregy technique, joint mobs  le stretches, ns exercises, hep, modalities as needed, Most of pain is in right L4,5S1 area, Start with [address], mfr, jnt mobs to this area, She has problems with extension more then flexion, trying pelvic , try working on jordi extension next rx due to tingling everywhere and assess ptx      [] Continue per plan of care [] Alter current plan (see comments)  [x] Plan of care initiated [] Hold pending MD visit [] Discharge    Electronically signed by: Melissa Amanda PT    Note: If patient does not return for scheduled/recommended follow up visits, this note will serve as a discharge from care along with the most recent update on progress.

## 2021-03-18 ENCOUNTER — HOSPITAL ENCOUNTER (OUTPATIENT)
Dept: PHYSICAL THERAPY | Age: 61
Setting detail: THERAPIES SERIES
End: 2021-03-18
Payer: COMMERCIAL

## 2021-04-06 ENCOUNTER — HOSPITAL ENCOUNTER (OUTPATIENT)
Dept: PHYSICAL THERAPY | Age: 61
Setting detail: THERAPIES SERIES
Discharge: HOME | End: 2021-04-06
Payer: COMMERCIAL

## 2021-04-06 PROCEDURE — 97140 MANUAL THERAPY 1/> REGIONS: CPT

## 2021-04-06 PROCEDURE — 97110 THERAPEUTIC EXERCISES: CPT

## 2021-04-06 NOTE — FLOWSHEET NOTE
function    Insurance/Certification information:  PT Insurance Information: [facility]  Physician Information:  Referring Practitioner: Dr. Brandon Escalante of care signed (Y/N): routed    Date of Patient follow up with Physician: not sure     Progress Report: []  Yes  [x]  No     Date Range for reporting period:  Beginnin2021  Ending:    Progress report due (10 Rx/or 30 days whichever is less): 3/35/68    Recertification due (POC duration/ or 90 days whichever is less):21    Visit # POC/ Insurance Allowable Auth Needed   9 9 per [facility] []Yes    []No     Functional Scale:       Date Assessed: at eval  Test: [place]-60  Score:     Pain level:  10     History of Injury:Patient is a 62 y/o female who was pulling a skid at [employer] and unloading them on 20. She started having  left thigh first and then back pain a few days later. She has had nothing done except for xrays since. She had an mri today. She c/o constant sharp nerve pain in her bialteral lb, hips and into her bilateral buttocks and hamstrings as well as left thigh and hip. She also has tingling in her vaginal area and tailbone. She says the pain is severe at times. She has used heating pads icy hot and some other things to help her back. She   Does  not know what makes it hurt more or feel better . She is working on light duty at this time. Denies changes in bowel or bladder.        SUBJECTIVE:  Pt states, \" They haven;t done any treatment since I was hurt back in  \"  21  Pt states, \" I am not quite as sore this week, still having a lot of nerve pain \"  21  Pt states, \" The doctor said I have bulging discs in my back and that's whats causing the nerve pain. I have tingling in my privates, left hip and leg and my right hip and waist.  You can't stretch my back again, it hurt my neck \"  21  Pt states, \" I didn't work today and I'm not as bad, yesterday I was really bad.   It gets so bad on the left that it's hard to stand, getting shooting pain in my waist on both sides and a lot of tingling \"  'it gets so bad I have to grab it \" \"  03/02/21 Patient reports traction helped some,tingling in her hips and both sides of the waist still bad after work. 03/04/21 Patient reports she still getting tingling in both legs and around waist.States driving is still tends to aggrevate her symptoms. 03/08/21 Patient reports tingling is about the same,still around the waist and in both hips. 3/15/21 Pt reports pain and numbness are about the same. 4/6/21  Pt states, \" I'm on anti inflammatories,  Seems to be helping a little \" \" I'm still having numbness and tingling in my feet that is severe, numbness in my butt and in my back \"    OBJECTIVE: 3/15/21 TLS spine range of motion all ranges wfl's no reports of increased pain, Quadrant test L/R each increase ipsilat. LBP. .  4/6/21  Flex- wfl, ext-10, sbr-20, sbl-20, rotr-50, rotl-50  Pain in left lb with ext and sb.      ROM   Comments   Lumbar Flex wfl     Lumbar Ext 5 painful in right lb        ROM LEFT RIGHT Comments   Lumbar Side Bend 10 10 Pain in her right lb   Lumbar Rotation 50 50     Quadrant   + painful   Hip Flexion 100 100     Hip Abd 25 25     Hip ER 30 30     Hip IR 20 20     Hip Extension 5 5     Knee Ext         Knee Flex         Hamstring Flex -20 -20     Piriformis tight tight     Tanner test  + +                     RESTRICTIONS/PRECAUTIONS: She is having a lot of nerve pain that does not follow a distinct dermatome, She said the md said he is not too worried about that    Exercises/Interventions:     Therapeutic Ex (87176)   Min: Resistance/Reps Notes/Cues   Nu step      Prone resting X 5 min         Ppt supine  5\" 20 x ea cues required to correct tech.   Reported some discomfort at back and hips  Make sure flexion exercises are not causing tingling, will try Anny exs to see if it helps tingling    PPT prone  5\" 20 x     chelly pose     clams  Increases tingling endurance, ROM  for improvements in proximal hip and core control with self care, mobility, lifting and ambulation.  [] (41213) Provided verbal/tactile cueing for activities related to improving balance, coordination, kinesthetic sense, posture, motor skill, proprioception  to assist with core control in self care, mobility, lifting, and ambulation. Therapeutic Activities:    [x] (55479 or 89610) Provided verbal/tactile cueing for activities related to improving balance, coordination, kinesthetic sense, posture, motor skill, proprioception and motor activation to allow for proper function  with self care and ADLs  [] (62298) Provided training and instruction to the patient for proper core and proximal hip recruitment and positioning with ambulation re-education     Home Exercise Program:    [x] (25577) Reviewed/Progressed HEP activities related to strengthening, flexibility, endurance, ROM of core, proximal hip and LE for functional self-care, mobility, lifting and ambulation   [] (54188) Reviewed/Progressed HEP activities related to improving balance, coordination, kinesthetic sense, posture, motor skill, proprioception of core, proximal hip and LE for self care, mobility, lifting, and ambulation      Manual Treatments:  PROM / STM / Oscillations-Mobs:  G-I, II, III, IV (PA's, Inf., Post.)  [x] (19465) Provided manual therapy to mobilize proximal hip and LS spine soft tissue/joints for the purpose of modulating pain, promoting relaxation,  increasing ROM, reducing/eliminating soft tissue swelling/inflammation/restriction, improving soft tissue extensibility and allowing for proper ROM for normal function with self care, mobility, lifting and ambulation.         [facility] TIME CODES    MODALITY                      START TIME   STOP TIME  US     man 500   5:15   exs 5:15 545   ptx         Charges:  Timed Code Treatment Minutes: 45   Total Treatment Minutes: 45     [] EVAL (LOW) 19065 (typically 20 minutes face-to-face)  [] EVAL (MOD) 79102 (typically 30 minutes face-to-face)  [] EVAL (HIGH) 04930 (typically 45 minutes face-to-face)  [] RE-EVAL     [x] OI(52205) x  2   [] Dry needle 1 or 2 Muscles (82963)  [] NMR (23917) x   [] Dry needle 3+ Muscles (02235)  [x] Manual (76946) x  1   [] Ultrasound (88287) x  [] TA (00404) x1     [] Mech Traction (95314)  [] ES(attended) (24455)     [] ES (un) (53397):   [] Vasopump (11798) [] Ionto (05823)   [] Other:  GOALS:  Patient stated goal: \" I want to function without pain \"  []? Progressing: []? Met: [x]? Not Met: []? Adjusted  Therapist goals for Patient:   Short Term Goals: To be achieved in: 2 weeks  1. Independent in HEP and progression per patient tolerance, in order to prevent re-injury. []? Progressing: []? Met: [x]? Not Met: []? Adjusted  2. Patient will have a decrease in pain to facilitate improvement in movement, function, and ADLs as indicated by Functional Deficits. []? Progressing: []? Met: [x]? Not Met: []? Adjusted     Long Term Goals: To be achieved in: 8 weeks  1. Disability index score of 25% or less for the SHARAD to assist with reaching prior level of function. []? Progressing: []? Met: [x]? Not Met: []? Adjusted  2. Patient will demonstrate increased AROM to  75% WNL, good LS mobility, good hip ROM to allow for proper joint functioning as indicated by patients Functional Deficits. []? Progressing: []? Met: [x]? Not Met: []? Adjusted  3. Patient will demonstrate an increase in Strength to good proximal hip and core activation to allow for proper functional mobility as indicated by patients Functional Deficits. []? Progressing: []? Met: [x]? Not Met: []? Adjusted  4. Patient will return to 80%  functional activities without increased symptoms or restriction. []? Progressing: []? Met: [x]? Not Met: []? Adjusted  5.(patient specific functional goal)    []? Progressing: []? Met: [x]? Not Met: []?  Adjusted              ASSESSMENT:  See eval    Treatment/Activity Tolerance:  [x] Patient tolerated treatment well [] Patient limited by fatique  [] Patient limited by pain  [] Patient limited by other medical complications  [] Other:     Overall Progression Towards Functional goals/ Treatment Progress Update:  [] Patient is progressing as expected towards functional goals listed. [] Progression is slowed due to complexities/Impairments listed. [] Progression has been slowed due to co-morbidities. [] Plan just implemented, too soon to assess goals progression <30days   [] Goals require adjustment due to lack of progress  [x] Patient is not progressing as expected and requires additional follow up with physician  [] Other:    Prognosis for POC: [] Good [] Fair  [x] Poor    Patient requires continued skilled intervention: [x] Yes  [] No        PLAN: Pt may benefit from a Jordi progression, patient appears to be hypermobile with week core  Lumbar rom, strength, myofascial release, muscle enregy technique, joint mobs  le stretches, ns exercises, hep, modalities as needed, Most of pain is in right L4,5S1 area, Start with [address], mfr, jnt mobs to this area, She has problems with extension more then flexion, trying pelvic , try working on jordi extension next rx due to tingling everywhere and assess ptx      [] Continue per plan of care [] Alter current plan (see comments)  [x] Plan of care initiated [] Hold pending MD visit [] Discharge    Electronically signed by: Dione Browne, PT    Note: If patient does not return for scheduled/recommended follow up visits, this note will serve as a discharge from care along with the most recent update on progress.

## 2021-04-14 ENCOUNTER — HOSPITAL ENCOUNTER (OUTPATIENT)
Dept: PHYSICAL THERAPY | Age: 61
Setting detail: THERAPIES SERIES
Discharge: HOME | End: 2021-04-14
Payer: COMMERCIAL

## 2021-04-14 PROCEDURE — 97012 MECHANICAL TRACTION THERAPY: CPT

## 2021-04-14 PROCEDURE — 97140 MANUAL THERAPY 1/> REGIONS: CPT

## 2021-04-14 PROCEDURE — 97110 THERAPEUTIC EXERCISES: CPT

## 2021-04-14 NOTE — FLOWSHEET NOTE
[facility]  Phone: [phone]   Fax:     [phone]         Physical Therapy Treatment Note/ Progress Report: ,  Disregard D/C summary. MD sent her back for more PT    Date:  2021    Patient Name:  Torito Broussard    :  1960  MRN: [id number]    Pertinent Medical History: Additional Pertinent Hx: Natural fusion of C5,C6    Medical/Treatment Diagnosis Information:  · Diagnosis: M54.5 (ICD-10-CM) - Low back pain  · Treatment Diagnosis: decreased abilty to function    Insurance/Certification information:  PT Insurance Information: [employer]  Physician Information:  Referring Practitioner: Dr. Lee Mom of care signed (Y/N): routed    Date of Patient follow up with Physician: not sure     Progress Report: []  Yes  [x]  No     Date Range for reporting period:  Beginnin2021  Ending:    Progress report due (10 Rx/or 30 days whichever is less): 8/10/93    Recertification due (POC duration/ or 90 days whichever is less):21    Visit # POC/ Insurance Allowable Auth Needed   10 16 per [facility] []Yes    []No     Functional Scale:       Date Assessed: at eval  Test: [place]-60  Score:     Pain level:  5/10     History of Injury:Patient is a 64 y/o female who was pulling a skid at [employer] and unloading them on 20. She started having  left thigh first and then back pain a few days later. She has had nothing done except for xrays since. She had an mri today. She c/o constant sharp nerve pain in her bialteral lb, hips and into her bilateral buttocks and hamstrings as well as left thigh and hip. She also has tingling in her vaginal area and tailbone. She says the pain is severe at times. She has used heating pads icy hot and some other things to help her back. She   Does  not know what makes it hurt more or feel better . She is working on light duty at this time.  Denies changes in bowel or bladder.        SUBJECTIVE:  Pt states, \" They haven;t done any treatment since I was hurt back in June \"  2/18/21  Pt states, \" I am not quite as sore this week, still having a lot of nerve pain \"  2/23/21  Pt states, \" The doctor said I have bulging discs in my back and that's whats causing the nerve pain. I have tingling in my privates, left hip and leg and my right hip and waist.  You can't stretch my back again, it hurt my neck \"  2/25/21  Pt states, \" I didn't work today and I'm not as bad, yesterday I was really bad. It gets so bad on the left that it's hard to stand, getting shooting pain in my waist on both sides and a lot of tingling \"  'it gets so bad I have to grab it \" \"  03/02/21 Patient reports traction helped some,tingling in her hips and both sides of the waist still bad after work. 03/04/21 Patient reports she still getting tingling in both legs and around waist.States driving is still tends to aggrevate her symptoms. 03/08/21 Patient reports tingling is about the same,still around the waist and in both hips. 3/15/21 Pt reports pain and numbness are about the same. 4/6/21  Pt states, \" I'm on anti inflammatories,  Seems to be helping a little \" \" I'm still having numbness and tingling in my feet that is severe, numbness in my butt and in my back \"  4/14/21  Pt states, \" I'm still getting the tingling in my right leg, still having a lot of pain \"    OBJECTIVE: 3/15/21 TLS spine range of motion all ranges wfl's no reports of increased pain, Quadrant test L/R each increase ipsilat. LBP. .  4/6/21  Flex- wfl, ext-10, sbr-20, sbl-20, rotr-50, rotl-50  Pain in left lb with ext and sb.      ROM   Comments   Lumbar Flex wfl     Lumbar Ext 5 painful in right lb        ROM LEFT RIGHT Comments   Lumbar Side Bend 10 10 Pain in her right lb   Lumbar Rotation 50 50     Quadrant   + painful   Hip Flexion 100 100     Hip Abd 25 25     Hip ER 30 30     Hip IR 20 20     Hip Extension 5 5   technique. Told her to cease this due to le tingling, 2/23/21   Piriformis stretch 60 x 3    str X 3 min    bridges X 20    clams X 30 Added 2/18/21               Other Therapeutic Activities:  Pt was educated on PT POC, Diagnosis, Prognosis, pathomechanics as well as frequency and duration of scheduling future physical therapy appointments. Time was also taken on this day to answer all patient questions and participation in PT. Reviewed appointment policy in detail with patient and patient verbalized understanding. Home Exercise Program: Patient demonstrated proper technique, good tolerance,  and was given written instructions for the above exercises      Therapeutic Exercise and NMR EXR  [x] (09989) Provided verbal/tactile cueing for activities related to strengthening, flexibility, endurance, ROM  for improvements in proximal hip and core control with self care, mobility, lifting and ambulation.  [] (18632) Provided verbal/tactile cueing for activities related to improving balance, coordination, kinesthetic sense, posture, motor skill, proprioception  to assist with core control in self care, mobility, lifting, and ambulation.      Therapeutic Activities:    [x] (64492 or 31918) Provided verbal/tactile cueing for activities related to improving balance, coordination, kinesthetic sense, posture, motor skill, proprioception and motor activation to allow for proper function  with self care and ADLs  [] (78628) Provided training and instruction to the patient for proper core and proximal hip recruitment and positioning with ambulation re-education     Home Exercise Program:    [x] (59647) Reviewed/Progressed HEP activities related to strengthening, flexibility, endurance, ROM of core, proximal hip and LE for functional self-care, mobility, lifting and ambulation   [] (55728) Reviewed/Progressed HEP activities related to improving balance, coordination, kinesthetic sense, posture, motor skill, proprioception of Progressing: []? Met: [x]? Not Met: []? Adjusted  2. Patient will demonstrate increased AROM to  75% WNL, good LS mobility, good hip ROM to allow for proper joint functioning as indicated by patients Functional Deficits. []? Progressing: []? Met: [x]? Not Met: []? Adjusted  3. Patient will demonstrate an increase in Strength to good proximal hip and core activation to allow for proper functional mobility as indicated by patients Functional Deficits. []? Progressing: []? Met: [x]? Not Met: []? Adjusted  4. Patient will return to 80%  functional activities without increased symptoms or restriction. []? Progressing: []? Met: [x]? Not Met: []? Adjusted  5.(patient specific functional goal)    []? Progressing: []? Met: [x]? Not Met: []? Adjusted              ASSESSMENT:  See eval    Treatment/Activity Tolerance:  [x] Patient tolerated treatment well [] Patient limited by fatique  [] Patient limited by pain  [] Patient limited by other medical complications  [] Other:     Overall Progression Towards Functional goals/ Treatment Progress Update:  [] Patient is progressing as expected towards functional goals listed. [x] Progression is slowed due to complexities/Impairments listed. No one is sure what is causing her pain and tingling. [] Progression has been slowed due to co-morbidities.   [] Plan just implemented, too soon to assess goals progression <30days   [] Goals require adjustment due to lack of progress  [] Patient is not progressing as expected and requires additional follow up with physician  [] Other:    Prognosis for POC: [] Good [] Fair  [x] Poor    Patient requires continued skilled intervention: [x] Yes  [] No        PLAN: Pt may benefit from a Anny progression, patient appears to be hypermobile with week core  Lumbar rom, strength, myofascial release, muscle enregy technique, joint mobs  le stretches, ns exercises, hep, modalities as needed, Most of pain is in right L4,5S1 area, Start with US, mfr, michaelt mobs to this area, She has problems with extension more then flexion, trying pelvic , try working on jordi extension next rx due to tingling everywhere and assess ptx. 4/14/21  Pt not showing much progress. We had d/c'ed her due to this but she was sent back by md for 6 more rx. Still having the same signs and symptoms. Very tender in right lumbar facets. Will attempt to improve facet motion and ns , flexibility exs. [] Continue per plan of care [] Alter current plan (see comments)  [x] Plan of care initiated [] Hold pending MD visit [] Discharge    Electronically signed by: Amanda Paige, PT    Note: If patient does not return for scheduled/recommended follow up visits, this note will serve as a discharge from care along with the most recent update on progress.

## 2021-04-21 ENCOUNTER — HOSPITAL ENCOUNTER (OUTPATIENT)
Dept: PHYSICAL THERAPY | Age: 61
Setting detail: THERAPIES SERIES
Discharge: HOME | End: 2021-04-21
Payer: COMMERCIAL

## 2021-04-21 PROCEDURE — 97012 MECHANICAL TRACTION THERAPY: CPT

## 2021-04-21 PROCEDURE — 97140 MANUAL THERAPY 1/> REGIONS: CPT

## 2021-04-21 PROCEDURE — 97035 APP MDLTY 1+ULTRASOUND EA 15: CPT

## 2021-04-21 PROCEDURE — 97110 THERAPEUTIC EXERCISES: CPT

## 2021-04-22 ENCOUNTER — HOSPITAL ENCOUNTER (OUTPATIENT)
Dept: PHYSICAL THERAPY | Age: 61
Setting detail: THERAPIES SERIES
Discharge: HOME | End: 2021-04-22
Payer: COMMERCIAL

## 2021-04-22 PROCEDURE — 97140 MANUAL THERAPY 1/> REGIONS: CPT

## 2021-04-22 PROCEDURE — 97035 APP MDLTY 1+ULTRASOUND EA 15: CPT

## 2021-04-22 PROCEDURE — 97012 MECHANICAL TRACTION THERAPY: CPT

## 2021-04-22 PROCEDURE — 97110 THERAPEUTIC EXERCISES: CPT

## 2021-04-22 NOTE — FLOWSHEET NOTE
[facility]. [address]  Phone: [phone]   Fax:     [phone]         Physical Therapy Treatment Note/ Progress Report: ,  Disregard D/C summary. MD sent her back for more PT    Date:  2021    Patient Name:  Aj Blandon    :  1960  MRN: [id number]    Pertinent Medical History: Additional Pertinent Hx: Natural fusion of C5,C6    Medical/Treatment Diagnosis Information:  · Diagnosis: M54.5 (ICD-10-CM) - Low back pain  · Treatment Diagnosis: decreased abilty to function    Insurance/Certification information:  PT Insurance Information: Elisa Mcguire  Physician Information:  Referring Practitioner: Dr. Maryana Cordova of care signed (Y/N): routed    Date of Patient follow up with Physician: not sure     Progress Report: []  Yes  [x]  No     Date Range for reporting period:  Beginnin2021  Ending:    Progress report due (10 Rx/or 30 days whichever is less):     Recertification due (POC duration/ or 90 days whichever is less):21    Visit # POC/ Insurance Allowable Auth Needed   12 16 per [facility] []Yes    []No     Functional Scale:       Date Assessed: at eval  Test: [place]-60  Score:     Pain level:  5/10     History of Injury:Patient is a 64 y/o female who was pulling a skid at [employer] and unloading them on 20. She started having  left thigh first and then back pain a few days later. She has had nothing done except for xrays since. She had an mri today. She c/o constant sharp nerve pain in her bialteral lb, hips and into her bilateral buttocks and hamstrings as well as left thigh and hip. She also has tingling in her vaginal area and tailbone. She says the pain is severe at times. She has used heating pads icy hot and some other things to help her back. She   Does  not know what makes it hurt more or feel better . She is working on light duty at this time.  Denies changes in bowel or bladder.        SUBJECTIVE:  Pt states, \" They haven;t done any treatment since I was hurt back in June \"  2/18/21  Pt states, \" I am not quite as sore this week, still having a lot of nerve pain \"  2/23/21  Pt states, \" The doctor said I have bulging discs in my back and that's whats causing the nerve pain. I have tingling in my privates, left hip and leg and my right hip and waist.  You can't stretch my back again, it hurt my neck \"  2/25/21  Pt states, \" I didn't work today and I'm not as bad, yesterday I was really bad. It gets so bad on the left that it's hard to stand, getting shooting pain in my waist on both sides and a lot of tingling \"  'it gets so bad I have to grab it \" \"  03/02/21 Patient reports traction helped some,tingling in her hips and both sides of the waist still bad after work. 03/04/21 Patient reports she still getting tingling in both legs and around waist.States driving is still tends to aggrevate her symptoms. 03/08/21 Patient reports tingling is about the same,still around the waist and in both hips. 3/15/21 Pt reports pain and numbness are about the same. 4/6/21  Pt states, \" I'm on anti inflammatories,  Seems to be helping a little \" \" I'm still having numbness and tingling in my feet that is severe, numbness in my butt and in my back \"  4/14/21  Pt states, \" I'm still getting the tingling in my right leg, still having a lot of pain \"  4/21/21  Pt states, \" stiff and sore today, back of my thighs, front of my thighs are sore and tingling, It s tingling in my ass as well like always. I'm also really sore and tingling in the middle of my back \" \"  04/22/21 Patient reports her both sides of her back hurt,numbness and tingling in her thighs and tailbone area is about the same. OBJECTIVE: 3/15/21 TLS spine range of motion all ranges wfl's no reports of increased pain, Quadrant test L/R each increase ipsilat. LBP. .  4/6/21  Flex- wfl, ext-10, sbr-20, sbl-20, rotr-50, rotl-50  Pain in left lb with ext and sb.      ROM   Comments   Lumbar Flex wfl     Lumbar Ext 5 painful in right lb        ROM LEFT RIGHT Comments   Lumbar Side Bend 10 10 Pain in her right lb   Lumbar Rotation 50 50     Quadrant   + painful   Hip Flexion 100 100     Hip Abd 25 25     Hip ER 30 30     Hip IR 20 20     Hip Extension 5 5     Knee Ext         Knee Flex         Hamstring Flex -20 -20     Piriformis tight tight     Tanner test  + +                     RESTRICTIONS/PRECAUTIONS: She is having a lot of nerve pain that does not follow a distinct dermatome, She said the md said he is not too worried about that    Exercises/Interventions:     Therapeutic Ex (99119)   Min: Resistance/Reps Notes/Cues   Nu step      Prone resting          Ppt supine  5\" 20 x ea cues required to correct tech. Reported some discomfort at back and hips  Make sure flexion exercises are not causing tingling, will try Anny exs to see if it helps tingling    PPT prone  5\" 20 x     chelly pose     clams  Increases tingling   birddog     pilates press 3 spr x 30    saw 2 spr x 20    Piriformis stretch 60 x 2    Sl abd 3\" hold 15 x L/R ea cues for training                 Reported right hip stiffness and upper back stiffness following exercise. Bridges  5\" 15 x cues for tech. Seated nerve glide          Manual Intervention (91573) Min:15 20 min     Mfr to bilateral lumbar, yadira, itb, gr 4 PA's to lower thor and lumbar sl facet gapping gentle, unilateral PA's right Gr3 L3,4,5, mfr  to bilat gluts, piriformis.   Very tight in right gluts, piriformis,  ((PA's at L3, L2 and L1 all reproduce parethesias at buttocks))  PA to L5 and L4 tolerated well    Flexion table               NMR re-education (26304)   Min:     Mf Activation- re-ed     TrA Re-ed activation     Glute Max re-ed activation          Therapeutic Activity (51596) Min:      Went over resting postioins, biomechanics, work positions, discussed mechanism of injury. Modalities  Min:      US US 1.5 cm2 to bilat lumbar yadira and SI x 10 min    Pelvic traction 60#/10#  60/20 x 15 min         HEP  Reviewed all and corrected technique. Told her to cease this due to le tingling, 2/23/21   Piriformis stretch 60 x 3    str X 3 min    bridges X 20    clams X 30 Added 2/18/21               Other Therapeutic Activities:  Pt was educated on PT POC, Diagnosis, Prognosis, pathomechanics as well as frequency and duration of scheduling future physical therapy appointments. Time was also taken on this day to answer all patient questions and participation in PT. Reviewed appointment policy in detail with patient and patient verbalized understanding. Home Exercise Program: Patient demonstrated proper technique, good tolerance,  and was given written instructions for the above exercises      Therapeutic Exercise and NMR EXR  [x] (87970) Provided verbal/tactile cueing for activities related to strengthening, flexibility, endurance, ROM  for improvements in proximal hip and core control with self care, mobility, lifting and ambulation.  [] (75517) Provided verbal/tactile cueing for activities related to improving balance, coordination, kinesthetic sense, posture, motor skill, proprioception  to assist with core control in self care, mobility, lifting, and ambulation.      Therapeutic Activities:    [x] (36445 or 31026) Provided verbal/tactile cueing for activities related to improving balance, coordination, kinesthetic sense, posture, motor skill, proprioception and motor activation to allow for proper function  with self care and ADLs  [] (05983) Provided training and instruction to the patient for proper core and proximal hip recruitment and positioning with ambulation re-education     Home Exercise Program:    [x] (18441) Reviewed/Progressed HEP activities related to strengthening, flexibility, endurance, ROM of core, proximal hip and LE for functional self-care, mobility, lifting and ambulation   [] (33327) Reviewed/Progressed HEP activities related to improving balance, coordination, kinesthetic sense, posture, motor skill, proprioception of core, proximal hip and LE for self care, mobility, lifting, and ambulation      Manual Treatments:  PROM / STM / Oscillations-Mobs:  G-I, II, III, IV (PA's, Inf., Post.)  [x] (23229) Provided manual therapy to mobilize proximal hip and LS spine soft tissue/joints for the purpose of modulating pain, promoting relaxation,  increasing ROM, reducing/eliminating soft tissue swelling/inflammation/restriction, improving soft tissue extensibility and allowing for proper ROM for normal function with self care, mobility, lifting and ambulation. [facility] TIME CODES    MODALITY                      START TIME   STOP TIME   545   man 795   175    600   ptx 600 615       Charges:  Timed Code Treatment Minutes: 40   Total Treatment Minutes: 55     [] EVAL (LOW) 12700 (typically 20 minutes face-to-face)  [] EVAL (MOD) 73187 (typically 30 minutes face-to-face)  [] EVAL (HIGH) 97939 (typically 45 minutes face-to-face)  [] RE-EVAL     [x] VC(66086) x  1   [] Dry needle 1 or 2 Muscles (50477)  [] NMR (64628) x   [] Dry needle 3+ Muscles (86494)  [x] Manual (66421) x  1   [x] Ultrasound (43966) x  [] TA (29320) x1     [x] Mech Traction (36015)  [] ES(attended) (09661)     [] ES (un) (58849):   [] Vasopump (54233) [] Ionto (48006)   [] Other:  GOALS:  Patient stated goal: \" I want to function without pain \"  []? Progressing: []? Met: [x]? Not Met: []? Adjusted  Therapist goals for Patient:   Short Term Goals: To be achieved in: 2 weeks  1. Independent in HEP and progression per patient tolerance, in order to prevent re-injury. [x]? Progressing: []? Met: []? Not Met: []? Adjusted  2. Patient will have a decrease in pain to facilitate improvement in movement, function, and ADLs as indicated by Functional Deficits. []? Progressing: []? Met: [x]?  Not strength, myofascial release, muscle enregy technique, joint mobs  le stretches, ns exercises, hep, modalities as needed, Most of pain is in right L4,5S1 area, Start with US, benjamin, rufino mobs to this area, She has problems with extension more then flexion, trying pelvic , try working on jordi extension next rx due to tingling everywhere and assess ptx. 4/14/21  Pt not showing much progress. We had d/c'ed her due to this but she was sent back by md for 6 more rx. Still having the same signs and symptoms. Very tender in right lumbar facets. Will attempt to improve facet motion and ns , flexibility exs. [] Continue per plan of care [] Alter current plan (see comments)  [x] Plan of care initiated [] Hold pending MD visit [] Discharge    Electronically signed by: Dayna Pac, PTA    Note: If patient does not return for scheduled/recommended follow up visits, this note will serve as a discharge from care along with the most recent update on progress.

## 2021-04-27 ENCOUNTER — HOSPITAL ENCOUNTER (OUTPATIENT)
Dept: PHYSICAL THERAPY | Age: 61
Setting detail: THERAPIES SERIES
Discharge: HOME | End: 2021-04-27
Payer: COMMERCIAL

## 2021-04-27 PROCEDURE — 97140 MANUAL THERAPY 1/> REGIONS: CPT

## 2021-04-27 PROCEDURE — 97035 APP MDLTY 1+ULTRASOUND EA 15: CPT

## 2021-04-27 PROCEDURE — 97112 NEUROMUSCULAR REEDUCATION: CPT

## 2021-04-27 PROCEDURE — 97110 THERAPEUTIC EXERCISES: CPT

## 2021-04-27 NOTE — FLOWSHEET NOTE
changes in bowel or bladder.        SUBJECTIVE:  Pt states, \" They haven;t done any treatment since I was hurt back in June \"  2/18/21  Pt states, \" I am not quite as sore this week, still having a lot of nerve pain \"  2/23/21  Pt states, \" The doctor said I have bulging discs in my back and that's whats causing the nerve pain. I have tingling in my privates, left hip and leg and my right hip and waist.  You can't stretch my back again, it hurt my neck \"  2/25/21  Pt states, \" I didn't work today and I'm not as bad, yesterday I was really bad. It gets so bad on the left that it's hard to stand, getting shooting pain in my waist on both sides and a lot of tingling \"  'it gets so bad I have to grab it \" \"  03/02/21 Patient reports traction helped some,tingling in her hips and both sides of the waist still bad after work. 03/04/21 Patient reports she still getting tingling in both legs and around waist.States driving is still tends to aggrevate her symptoms. 03/08/21 Patient reports tingling is about the same,still around the waist and in both hips. 3/15/21 Pt reports pain and numbness are about the same. 4/6/21  Pt states, \" I'm on anti inflammatories,  Seems to be helping a little \" \" I'm still having numbness and tingling in my feet that is severe, numbness in my butt and in my back \"  4/14/21  Pt states, \" I'm still getting the tingling in my right leg, still having a lot of pain \"  4/21/21  Pt states, \" stiff and sore today, back of my thighs, front of my thighs are sore and tingling, It s tingling in my ass as well like always. I'm also really sore and tingling in the middle of my back \" \"  04/22/21 Patient reports her both sides of her back hurt,numbness and tingling in her thighs and tailbone area is about the same.   4/27/21  Pt states, \" My right side is sore \"  \" The emg Dr. Emani Purdy my nerves in my back were fine \"    OBJECTIVE: 3/15/21 TLS spine range of motion all ranges wfl's no reports of increased pain, Quadrant test L/R each increase ipsilat. LBP. .  4/6/21  Flex- wfl, ext-10, sbr-20, sbl-20, rotr-50, rotl-50  Pain in left lb with ext and sb.      ROM   Comments   Lumbar Flex wfl     Lumbar Ext 5 painful in right lb        ROM LEFT RIGHT Comments   Lumbar Side Bend 10 10 Pain in her right lb   Lumbar Rotation 50 50     Quadrant   + painful   Hip Flexion 100 100     Hip Abd 25 25     Hip ER 30 30     Hip IR 20 20     Hip Extension 5 5     Knee Ext         Knee Flex         Hamstring Flex -20 -20     Piriformis tight tight     Tanner test  + +                     RESTRICTIONS/PRECAUTIONS: She is having a lot of nerve pain that does not follow a distinct dermatome, She said the md said he is not too worried about that    Exercises/Interventions:     Therapeutic Ex (73624)   Min: Resistance/Reps Notes/Cues   Nu step      Prone resting          Ppt supine  5\" 20 x ea cues required to correct tech. Reported some discomfort at back and hips  Make sure flexion exercises are not causing tingling, will try Anny exs to see if it helps tingling    PPT prone  5\" 20 x     chelly pose     clams  Increases tingling   birddog     pilates press 3 spr x 30    saw 2 spr x 20    Piriformis stretch 60 x 2    Sl abd 3\" hold 15 x L/R ea cues for training                 Reported right hip stiffness and upper back stiffness following exercise. Bridges  5\" 15 x cues for tech. Seated nerve glide     alligator X 2 min ea    90+90 nerve glide X 50 ea              Manual Intervention (43727) Min:15 20 min     Mfr to bilateral lumbar, yadira, itb, gr 4 PA's to lower thor and lumbar sl facet gapping gentle, unilateral PA's right Gr3 L3,4,5, mfr  to bilat gluts, piriformis.   Very tight in right gluts, piriformis,  ((PA's at L3, L2 and L1 all reproduce parethesias at buttocks))  PA to L5 and L4 tolerated well    Flexion table               NMR re-education (88723)   Min:     Mf Activation- re-ed     TrA Re-ed activation     Glute Max re-ed activation          Therapeutic Activity (29530) Min:      Went over resting postioins, biomechanics, work positions, discussed mechanism of injury. Modalities  Min:      US US 1.5 cm2 to bilat lumbar yadira and SI x 10 min    Pelvic traction 60#/10#  60/20 x 15 min         HEP  Reviewed all and corrected technique. Told her to cease this due to le tingling, 2/23/21   Piriformis stretch 60 x 3    str X 3 min    bridges X 20    clams X 30 Added 2/18/21               Other Therapeutic Activities:  Pt was educated on PT POC, Diagnosis, Prognosis, pathomechanics as well as frequency and duration of scheduling future physical therapy appointments. Time was also taken on this day to answer all patient questions and participation in PT. Reviewed appointment policy in detail with patient and patient verbalized understanding. Home Exercise Program: Patient demonstrated proper technique, good tolerance,  and was given written instructions for the above exercises      Therapeutic Exercise and NMR EXR  [x] (45063) Provided verbal/tactile cueing for activities related to strengthening, flexibility, endurance, ROM  for improvements in proximal hip and core control with self care, mobility, lifting and ambulation.  [] (59530) Provided verbal/tactile cueing for activities related to improving balance, coordination, kinesthetic sense, posture, motor skill, proprioception  to assist with core control in self care, mobility, lifting, and ambulation.      Therapeutic Activities:    [x] (49328 or 89251) Provided verbal/tactile cueing for activities related to improving balance, coordination, kinesthetic sense, posture, motor skill, proprioception and motor activation to allow for proper function  with self care and ADLs  [] (87852) Provided training and instruction to the patient for proper core and proximal hip recruitment and positioning with ambulation re-education     Home Exercise Program:    [x] (81888) have a decrease in pain to facilitate improvement in movement, function, and ADLs as indicated by Functional Deficits. []? Progressing: []? Met: [x]? Not Met: []? Adjusted     Long Term Goals: To be achieved in: 8 weeks  1. Disability index score of 25% or less for the SHARAD to assist with reaching prior level of function. []? Progressing: []? Met: [x]? Not Met: []? Adjusted  2. Patient will demonstrate increased AROM to  75% WNL, good LS mobility, good hip ROM to allow for proper joint functioning as indicated by patients Functional Deficits. []? Progressing: []? Met: [x]? Not Met: []? Adjusted  3. Patient will demonstrate an increase in Strength to good proximal hip and core activation to allow for proper functional mobility as indicated by patients Functional Deficits. []? Progressing: []? Met: [x]? Not Met: []? Adjusted  4. Patient will return to 80%  functional activities without increased symptoms or restriction. []? Progressing: []? Met: [x]? Not Met: []? Adjusted  5.(patient specific functional goal)    []? Progressing: []? Met: [x]? Not Met: []? Adjusted              ASSESSMENT:  See eval    Treatment/Activity Tolerance:  [x] Patient tolerated treatment well [] Patient limited by fatique  [] Patient limited by pain  [] Patient limited by other medical complications  [] Other:     Overall Progression Towards Functional goals/ Treatment Progress Update:  [] Patient is progressing as expected towards functional goals listed. [x] Progression is slowed due to complexities/Impairments listed. No one is sure what is causing her pain and tingling. [] Progression has been slowed due to co-morbidities.   [] Plan just implemented, too soon to assess goals progression <30days   [] Goals require adjustment due to lack of progress  [] Patient is not progressing as expected and requires additional follow up with physician  [] Other:    Prognosis for POC: [] Good [] Fair  [x] Poor    Patient requires continued skilled intervention: [x] Yes  [] No        PLAN: Pt may benefit from a Jordi progression, patient appears to be hypermobile with week core  Lumbar rom, strength, myofascial release, muscle enregy technique, joint mobs  le stretches, ns exercises, hep, modalities as needed, Most of pain is in right L4,5S1 area, Start with US, mfr, jnt mobs to this area, She has problems with extension more then flexion, trying pelvic , try working on jordi extension next rx due to tingling everywhere and assess ptx. 4/14/21  Pt not showing much progress. We had d/c'ed her due to this but she was sent back by md for 6 more rx. Still having the same signs and symptoms. Very tender in right lumbar facets. Will attempt to improve facet motion and ns , flexibility exs.4/27/21  Pt not showing much progress, temporary relief. Will finish the last few rx and d/c if no improvement. [] Continue per plan of care [] Alter current plan (see comments)  [x] Plan of care initiated [] Hold pending MD visit [] Discharge    Electronically signed by: Neelima Leos, PT    Note: If patient does not return for scheduled/recommended follow up visits, this note will serve as a discharge from care along with the most recent update on progress.

## 2021-04-29 ENCOUNTER — APPOINTMENT (OUTPATIENT)
Dept: PHYSICAL THERAPY | Age: 61
End: 2021-04-29
Payer: COMMERCIAL

## 2021-05-04 ENCOUNTER — HOSPITAL ENCOUNTER (OUTPATIENT)
Dept: PHYSICAL THERAPY | Age: 61
Setting detail: THERAPIES SERIES
Discharge: HOME | End: 2021-05-04
Payer: COMMERCIAL

## 2021-05-04 PROCEDURE — 97035 APP MDLTY 1+ULTRASOUND EA 15: CPT

## 2021-05-04 PROCEDURE — 97012 MECHANICAL TRACTION THERAPY: CPT

## 2021-05-04 PROCEDURE — 97140 MANUAL THERAPY 1/> REGIONS: CPT

## 2021-05-04 PROCEDURE — 97110 THERAPEUTIC EXERCISES: CPT

## 2021-05-04 NOTE — FLOWSHEET NOTE
[facility]. [address]  Phone: [phone]   Fax:     [phone]    Physical Therapy Discharge Summary    Dear  ,Dr. Lynsey Storm,     We had the pleasure of treating the following patient for physical therapy services at [address] [place] [address]. A summary of our findings can be found in the discharge summary below. This includes our final assessment. If you have any questions or concerns regarding these findings, please do not hesitate to contact me at the office phone number checked above. Thank you for the referral.       Physician Signature:________________________________Date:__________________  By signing above, therapists plan is approved by physician        Patient: Suze Arce   : 1960   MRN: [id number]  Referring Physician:        Evaluation Date: 2021        Medical Diagnosis Information:  ·   Diagnosis: M54.5 (ICD-10-CM) - Low back pain  · Treatment Diagnosis: decreased abilty to function  ·     ·       Insurance/Certification information:   workers comp    Date Range of Treatment: -21  Total [id number]     Functional Outcomes Measure: at discharge  Tonny Zambrano    SUBJECTIVE:\" I am still having pain and tingling like I have all along \"       Pain Scale:6/10    Type: [x]Constant   []Intermitment  []Radiating []Localized  []other:     Functional Limitations: [x]Sitting [x]Standing [x]Walking    [x]Squatting [x]Stairs            [x]ADL's  [x]Driving [x]Sports/Recreation  [x]Other:work      OBJECTIVE- see below        ASSESSMENT: Pt has been seen for 14 rx with some temporary relief. She continues to c/o the same problems she has all along.   DCPT  Goals not met      Response to Treatment:   []Patient met all goals and has responded well to treatment and will continue HEP   []Patient should continue to improve in reasonable time if they continue HEP   [x]Patient has plateaued and is no longer responding to skilled PT intervention, no significant  improvement    []Patient is getting worse and would benefit from return to referring MD   []Patient unable to adhere to initial POC      GOALS: GOALS:  Patient stated goal: \" I want to function without pain \"  []? Progressing: []? Met: [x]? Not Met: []? Adjusted  Therapist goals for Patient:   Short Term Goals: To be achieved in: 2 weeks  1. Independent in HEP and progression per patient tolerance, in order to prevent re-injury. [x]? Progressing: []? Met: []? Not Met: []? Adjusted  2. Patient will have a decrease in pain to facilitate improvement in movement, function, and ADLs as indicated by Functional Deficits. []? Progressing: []? Met: [x]? Not Met: []? Adjusted     Long Term Goals: To be achieved in: 8 weeks  1. Disability index score of 25% or less for the SHARAD to assist with reaching prior level of function. []? Progressing: []? Met: [x]? Not Met: []? Adjusted  2. Patient will demonstrate increased AROM to  75% WNL, good LS mobility, good hip ROM to allow for proper joint functioning as indicated by patients Functional Deficits. []? Progressing: []? Met: [x]? Not Met: []? Adjusted  3. Patient will demonstrate an increase in Strength to good proximal hip and core activation to allow for proper functional mobility as indicated by patients Functional Deficits. []? Progressing: []? Met: [x]? Not Met: []? Adjusted  4. Patient will return to 80%  functional activities without increased symptoms or restriction. []? Progressing: []? Met: [x]? Not Met: []? Adjusted  5.(patient specific functional goal)    []? Progressing: []? Met: [x]? Not Met: []? Adjusted                 PLAN:   .  Patient will need to maintain their HEP in order to prevent re-occurrence.       Reason for Discharge:  [] Goals Met   [] Patient did not return after initial evaluation   [x] Progress Plateaued  [] Missed _____ scheduled appointments   [] No insurance coverage [] Patient terminated therapy   [] MD discharged from PT [] Financial Limitations  [x] Other: Lack of progress      Electronically signed by:  Johnathan Osborn, [address] [facility]  [address] [facility] [address]  Phone: [phone]   Fax:     [phone]       Physical Therapy DC summary    Date:  2021    Patient Name:  Sumit Saunders    :  1960  MRN: [id number]    Pertinent Medical History: Additional Pertinent Hx: Natural fusion of C5,C6    Medical/Treatment Diagnosis Information:  · Diagnosis: M54.5 (ICD-10-CM) - Low back pain  · Treatment Diagnosis: decreased abilty to function    Insurance/Certification information:  PT Insurance Information: [employer]  Physician Information:  Referring Practitioner: Dr. Tahira Garcia of care signed (Y/N): routed    Date of Patient follow up with Physician: not sure     Progress Report: []  Yes  [x]  No     Date Range for reporting period:  Beginnin2021  Ending:    Progress report due (10 Rx/or 30 days whichever is less):     Recertification due (POC duration/ or 90 days whichever is less):21    Visit # POC/ Insurance Allowable Auth Needed   14 16 per [facility] []Yes    []No     Functional Scale:       Date Assessed: at eval  Test: [place]-60  Score:     Pain level:  5/10     History of Injury:Patient is a 62 y/o female who was pulling a skid at [employer] and unloading them on 20. She started having  left thigh first and then back pain a few days later. She has had nothing done except for xrays since. She had an mri today. She c/o constant sharp nerve pain in her bialteral lb, hips and into her bilateral buttocks and hamstrings as well as left thigh and hip. She also has tingling in her vaginal area and tailbone. She says the pain is severe at times. She has used heating pads icy hot and some other things to help her back.  She   Does not know what makes it hurt more or feel better . She is working on light duty at this time. Denies changes in bowel or bladder.        SUBJECTIVE:  Pt states, \" They haven;t done any treatment since I was hurt back in June \"  2/18/21  Pt states, \" I am not quite as sore this week, still having a lot of nerve pain \"  2/23/21  Pt states, \" The doctor said I have bulging discs in my back and that's whats causing the nerve pain. I have tingling in my privates, left hip and leg and my right hip and waist.  You can't stretch my back again, it hurt my neck \"  2/25/21  Pt states, \" I didn't work today and I'm not as bad, yesterday I was really bad. It gets so bad on the left that it's hard to stand, getting shooting pain in my waist on both sides and a lot of tingling \"  'it gets so bad I have to grab it \" \"  03/02/21 Patient reports traction helped some,tingling in her hips and both sides of the waist still bad after work. 03/04/21 Patient reports she still getting tingling in both legs and around waist.States driving is still tends to aggrevate her symptoms. 03/08/21 Patient reports tingling is about the same,still around the waist and in both hips. 3/15/21 Pt reports pain and numbness are about the same. 4/6/21  Pt states, \" I'm on anti inflammatories,  Seems to be helping a little \" \" I'm still having numbness and tingling in my feet that is severe, numbness in my butt and in my back \"  4/14/21  Pt states, \" I'm still getting the tingling in my right leg, still having a lot of pain \"  4/21/21  Pt states, \" stiff and sore today, back of my thighs, front of my thighs are sore and tingling, It s tingling in my ass as well like always. I'm also really sore and tingling in the middle of my back \" \"  04/22/21 Patient reports her both sides of her back hurt,numbness and tingling in her thighs and tailbone area is about the same.   4/27/21  Pt states, \" My right side is sore \"  \" The emg Dr. Evans [place] my nerves in my back were fine \"  5/4/21  Pt states, \" pain in my pelvis is a little better, the numbness and tingling as well as the back pain is the same \"    OBJECTIVE: 3/15/21 TLS spine range of motion all ranges wfl's no reports of increased pain, Quadrant test L/R each increase ipsilat. LBP. .  4/6/21  Flex- wfl, ext-10, sbr-20, sbl-20, rotr-50, rotl-50  Pain in left lb with ext and sb.  5/4/21 flex-wfl ext-10, sbr-25, sbl-25, rot-50,  Pain in left lb the same as  It has been. She was also c/o tingling in her \"prives\"  , down both hamstrings and in her left lateral thigh.       ROM   Comments   Lumbar Flex wfl     Lumbar Ext 5 painful in right lb        ROM LEFT RIGHT Comments   Lumbar Side Bend 10 10 Pain in her right lb   Lumbar Rotation 50 50     Quadrant   + painful   Hip Flexion 100 100     Hip Abd 25 25     Hip ER 30 30     Hip IR 20 20     Hip Extension 5 5     Knee Ext         Knee Flex         Hamstring Flex -20 -20     Piriformis tight tight     Tanner test  + +                     RESTRICTIONS/PRECAUTIONS: She is having a lot of nerve pain that does not follow a distinct dermatome, She said the md said he is not too worried about that    Exercises/Interventions:     Therapeutic Ex (31545)   Min: Resistance/Reps Notes/Cues   Nu step      Prone resting          Ppt supine  5\" 20 x ea cues required to correct tech. Reported some discomfort at back and hips  Make sure flexion exercises are not causing tingling, will try Anny exs to see if it helps tingling    PPT prone  5\" 20 x     chelly pose     clams  Increases tingling   birddog     pilates press 3 spr x 30    saw 2 spr x 20    Piriformis stretch 60 x 2    Sl abd 3\" hold 15 x L/R ea cues for training                 Reported right hip stiffness and upper back stiffness following exercise. Bridges  5\" 15 x cues for tech.     Seated nerve glide     alligator X 2 min ea    90+90 nerve glide X 50 ea              Manual Intervention (67620) Min:15 20 min     Mfr to bilateral lumbar, yadira, itb, gr 4 PA's to lower thor and lumbar sl facet gapping gentle, unilateral PA's right Gr3 L3,4,5, mfr  to bilat gluts, piriformis. Very tight in right gluts, piriformis,  ((PA's at L3, L2 and L1 all reproduce parethesias at buttocks))  PA to L5 and L4 tolerated well    Flexion table               NMR re-education (58248)   Min:     Mf Activation- re-ed     TrA Re-ed activation     Glute Max re-ed activation          Therapeutic Activity (22095) Min:      Went over resting postioins, biomechanics, work positions, discussed mechanism of injury. Modalities  Min:      US US 1.5 cm2 to bilat lumbar yadira and SI x 10 min    Pelvic traction 60#/10#  60/20 x 15 min         HEP  Reviewed all and corrected technique. Told her to cease this due to le tingling, 2/23/21   Piriformis stretch 60 x 3    str X 3 min    bridges X 20    clams X 30 Added 2/18/21               Other Therapeutic Activities:  Pt was educated on PT POC, Diagnosis, Prognosis, pathomechanics as well as frequency and duration of scheduling future physical therapy appointments. Time was also taken on this day to answer all patient questions and participation in PT. Reviewed appointment policy in detail with patient and patient verbalized understanding. Home Exercise Program: Patient demonstrated proper technique, good tolerance,  and was given written instructions for the above exercises      Therapeutic Exercise and NMR EXR  [x] (27932) Provided verbal/tactile cueing for activities related to strengthening, flexibility, endurance, ROM  for improvements in proximal hip and core control with self care, mobility, lifting and ambulation.  [] (07855) Provided verbal/tactile cueing for activities related to improving balance, coordination, kinesthetic sense, posture, motor skill, proprioception  to assist with core control in self care, mobility, lifting, and ambulation.      Therapeutic Activities:    [x] (60500 or 81660) Provided verbal/tactile cueing for activities related to improving balance, coordination, kinesthetic sense, posture, motor skill, proprioception and motor activation to allow for proper function  with self care and ADLs  [] (98065) Provided training and instruction to the patient for proper core and proximal hip recruitment and positioning with ambulation re-education     Home Exercise Program:    [x] (09004) Reviewed/Progressed HEP activities related to strengthening, flexibility, endurance, ROM of core, proximal hip and LE for functional self-care, mobility, lifting and ambulation   [] (79448) Reviewed/Progressed HEP activities related to improving balance, coordination, kinesthetic sense, posture, motor skill, proprioception of core, proximal hip and LE for self care, mobility, lifting, and ambulation      Manual Treatments:  PROM / STM / Oscillations-Mobs:  G-I, II, III, IV (PA's, Inf., Post.)  [x] (29882) Provided manual therapy to mobilize proximal hip and LS spine soft tissue/joints for the purpose of modulating pain, promoting relaxation,  increasing ROM, reducing/eliminating soft tissue swelling/inflammation/restriction, improving soft tissue extensibility and allowing for proper ROM for normal function with self care, mobility, lifting and ambulation.         [facility] TIME CODES    MODALITY                      START TIME   STOP TIME   515   man 128   805    944    75       Charges:  Timed Code Treatment Minutes: 45   Total Treatment Minutes: 55     [] EVAL (LOW) 17141 (typically 20 minutes face-to-face)  [] EVAL (MOD) 95418 (typically 30 minutes face-to-face)  [] EVAL (HIGH) 87685 (typically 45 minutes face-to-face)  [] RE-EVAL     [x] RP(20550) x  1   [] Dry needle 1 or 2 Muscles (88774)  [] NMR (67550) x   [] Dry needle 3+ Muscles (42097)  [x] Manual (90968) x  1   [x] Ultrasound (88686) x1  [] TA (66899) x1     [x] Mech Traction (99993)  [] ES(attended) (90003)     [] ES (un) (93471): [] Vasopump (84081) [] Ionto (42947)   [] Other:  GOALS:  Patient stated goal: \" I want to function without pain \"  []? Progressing: []? Met: [x]? Not Met: []? Adjusted  Therapist goals for Patient:   Short Term Goals: To be achieved in: 2 weeks  1. Independent in HEP and progression per patient tolerance, in order to prevent re-injury. [x]? Progressing: []? Met: []? Not Met: []? Adjusted  2. Patient will have a decrease in pain to facilitate improvement in movement, function, and ADLs as indicated by Functional Deficits. []? Progressing: []? Met: [x]? Not Met: []? Adjusted     Long Term Goals: To be achieved in: 8 weeks  1. Disability index score of 25% or less for the SHARAD to assist with reaching prior level of function. []? Progressing: []? Met: [x]? Not Met: []? Adjusted  2. Patient will demonstrate increased AROM to  75% WNL, good LS mobility, good hip ROM to allow for proper joint functioning as indicated by patients Functional Deficits. []? Progressing: []? Met: [x]? Not Met: []? Adjusted  3. Patient will demonstrate an increase in Strength to good proximal hip and core activation to allow for proper functional mobility as indicated by patients Functional Deficits. []? Progressing: []? Met: [x]? Not Met: []? Adjusted  4. Patient will return to 80%  functional activities without increased symptoms or restriction. []? Progressing: []? Met: [x]? Not Met: []? Adjusted  5.(patient specific functional goal)    []? Progressing: []? Met: [x]? Not Met: []? Adjusted              ASSESSMENT:  See eval    Treatment/Activity Tolerance:  [x] Patient tolerated treatment well [] Patient limited by fatique  [] Patient limited by pain  [] Patient limited by other medical complications  [] Other:     Overall Progression Towards Functional goals/ Treatment Progress Update:  [] Patient is progressing as expected towards functional goals listed. [x] Progression is slowed due to complexities/Impairments listed.  No one is sure what is causing her pain and tingling. [] Progression has been slowed due to co-morbidities. [] Plan just implemented, too soon to assess goals progression <30days   [] Goals require adjustment due to lack of progress  [] Patient is not progressing as expected and requires additional follow up with physician  [] Other:    Prognosis for POC: [] Good [] Fair  [x] Poor    Patient requires continued skilled intervention: [x] Yes  [] No        PLAN: Pt may benefit from a Jordi progression, patient appears to be hypermobile with week core  Lumbar rom, strength, myofascial release, muscle enregy technique, joint mobs  le stretches, ns exercises, hep, modalities as needed, Most of pain is in right L4,5S1 area, Start with [address], mfr, jnt mobs to this area, She has problems with extension more then flexion, trying pelvic , try working on jordi extension next rx due to tingling everywhere and assess ptx. 4/14/21  Pt not showing much progress. We had d/c'ed her due to this but she was sent back by md for 6 more rx. Still having the same signs and symptoms. Very tender in right lumbar facets. Will attempt to improve facet motion and ns , flexibility exs.4/27/21  Pt not showing much progress, temporary relief. Will finish the last few rx and d/c if no improvement. 5/4/21  Pt has shown very little improvement so far. She is having a lot of nerve pain, painful lumbar facets and very tight lumbar paraspinals. I do not see the point in finishing the final few visits due to lack of progress. .  Will send her back to the md since she has had very little improvement and no one seems to know what is causing her neurological symptoms.        [] Continue per plan of care [] Alter current plan (see comments)  [x] Plan of care initiated [] Hold pending MD visit [x] Discharge    Electronically signed by: Beck Abernathy, PT    Note: If patient does not return for scheduled/recommended follow up visits, this note will

## 2021-05-11 ENCOUNTER — HOSPITAL ENCOUNTER (OUTPATIENT)
Dept: MRI IMAGING | Age: 61
Discharge: HOME | End: 2021-05-11
Payer: COMMERCIAL

## 2021-05-11 DIAGNOSIS — S76.012A STRAIN OF MUSCLE, FASCIA AND TENDON OF LEFT HIP, INITIAL ENCOUNTER: ICD-10-CM

## 2021-05-11 PROCEDURE — 73721 MRI JNT OF LWR EXTRE W/O DYE: CPT

## 2021-06-02 ENCOUNTER — HOSPITAL ENCOUNTER (OUTPATIENT)
Dept: WOMENS IMAGING | Age: 61
Discharge: HOME | End: 2021-06-02
Payer: COMMERCIAL

## 2021-06-02 DIAGNOSIS — Z12.31 VISIT FOR SCREENING MAMMOGRAM: ICD-10-CM

## 2021-06-02 PROCEDURE — 77063 BREAST TOMOSYNTHESIS BI: CPT

## 2022-08-30 ENCOUNTER — HOSPITAL ENCOUNTER (OUTPATIENT)
Dept: WOMENS IMAGING | Age: 62
Discharge: HOME | End: 2022-08-30
Payer: COMMERCIAL

## 2022-08-30 DIAGNOSIS — Z12.31 VISIT FOR SCREENING MAMMOGRAM: ICD-10-CM

## 2022-08-30 PROCEDURE — 77063 BREAST TOMOSYNTHESIS BI: CPT

## 2022-08-30 PROCEDURE — 77067 SCR MAMMO BI INCL CAD: CPT

## 2022-10-18 NOTE — PROGRESS NOTES
[facility] ENDOSCOPY EGD PRE-OPERATIVE INSTRUCTIONS    Procedure date_10/25/2022________Arrival time__0830__________        Surgery time__0930__________       Nothing by mouth after midnight the night before the procedure. This includes water chewing gum, mints and ice chips. You may brush your teeth and gargle the morning of your surgery, but do not swallow the water    You may be asked to stop blood thinners such as Coumadin, Plavix, Fragmin, Lovenox, etc., or any anti-inflammatories such as:  Aspirin, Ibuprofen, Advil, Naproxen prior to your procedure. We also ask that you stop any OTC medications such as fish oil, vitamin E, glucosamine, garlic, Multivitamins, COQ 10, etc.    You must make arrangements for a responsible adult to arrive with you and stay in our waiting area during your procedure. They will also need to take you home after your procedure. For your safety you will not be allowed to leave alone or drive yourself home. Also for your safety, it is strongly suggested that someone stay with you the first 24 hours after your procedure. For your comfort, please wear simple loose fitting clothing to the center. Please do not bring valuables. If you have a living will and a durable power of  for healthcare, please bring in a copy.     You will need to bring a photo ID and insurance card    Our goal is to provide you with excellent care so if you have any questions, please contact us at the [facility] at [phone]         Please note these are generalized instructions for all EGD cases, you may be provided with more specific instructions if necessary

## 2022-10-24 ENCOUNTER — ANESTHESIA EVENT (OUTPATIENT)
Dept: ENDOSCOPY | Age: 62
End: 2022-10-24
Payer: COMMERCIAL

## 2022-10-24 RX ORDER — NITROFURANTOIN 25; 75 MG/1; MG/1
100 CAPSULE ORAL 2 TIMES DAILY
COMMUNITY

## 2022-10-25 ENCOUNTER — HOSPITAL ENCOUNTER (OUTPATIENT)
Age: 62
Setting detail: OUTPATIENT SURGERY
Discharge: HOME | End: 2022-10-25
Attending: INTERNAL MEDICINE | Admitting: INTERNAL MEDICINE
Payer: COMMERCIAL

## 2022-10-25 ENCOUNTER — ANESTHESIA (OUTPATIENT)
Dept: ENDOSCOPY | Age: 62
End: 2022-10-25
Payer: COMMERCIAL

## 2022-10-25 VITALS
DIASTOLIC BLOOD PRESSURE: 58 MMHG | TEMPERATURE: 97.3 F | WEIGHT: 133 LBS | HEIGHT: 64 IN | HEART RATE: 62 BPM | RESPIRATION RATE: 18 BRPM | OXYGEN SATURATION: 99 % | SYSTOLIC BLOOD PRESSURE: 115 MMHG | BODY MASS INDEX: 22.71 KG/M2

## 2022-10-25 PROCEDURE — 3700000000 HC ANESTHESIA ATTENDED CARE: Performed by: INTERNAL MEDICINE

## 2022-10-25 PROCEDURE — 3609017100 HC EGD: Performed by: INTERNAL MEDICINE

## 2022-10-25 PROCEDURE — 6360000002 HC RX W HCPCS

## 2022-10-25 PROCEDURE — 2500000003 HC RX 250 WO HCPCS

## 2022-10-25 PROCEDURE — 7100000010 HC PHASE II RECOVERY - FIRST 15 MIN: Performed by: INTERNAL MEDICINE

## 2022-10-25 PROCEDURE — 2580000003 HC RX 258: Performed by: ANESTHESIOLOGY

## 2022-10-25 PROCEDURE — 3700000001 HC ADD 15 MINUTES (ANESTHESIA): Performed by: INTERNAL MEDICINE

## 2022-10-25 PROCEDURE — 7100000011 HC PHASE II RECOVERY - ADDTL 15 MIN: Performed by: INTERNAL MEDICINE

## 2022-10-25 RX ORDER — DIPHENHYDRAMINE HYDROCHLORIDE 50 MG/ML
12.5 INJECTION INTRAMUSCULAR; INTRAVENOUS
Status: CANCELLED | OUTPATIENT
Start: 2022-10-25 | End: 2022-10-26

## 2022-10-25 RX ORDER — PROPOFOL 10 MG/ML
INJECTION, EMULSION INTRAVENOUS PRN
Status: DISCONTINUED | OUTPATIENT
Start: 2022-10-25 | End: 2022-10-25 | Stop reason: SDUPTHER

## 2022-10-25 RX ORDER — SODIUM CHLORIDE 0.9 % (FLUSH) 0.9 %
5-40 SYRINGE (ML) INJECTION PRN
Status: DISCONTINUED | OUTPATIENT
Start: 2022-10-25 | End: 2022-10-25 | Stop reason: HOSPADM

## 2022-10-25 RX ORDER — SODIUM CHLORIDE 9 MG/ML
INJECTION, SOLUTION INTRAVENOUS PRN
Status: DISCONTINUED | OUTPATIENT
Start: 2022-10-25 | End: 2022-10-25 | Stop reason: HOSPADM

## 2022-10-25 RX ORDER — PROPOFOL 10 MG/ML
INJECTION, EMULSION INTRAVENOUS CONTINUOUS PRN
Status: DISCONTINUED | OUTPATIENT
Start: 2022-10-25 | End: 2022-10-25 | Stop reason: SDUPTHER

## 2022-10-25 RX ORDER — ONDANSETRON 2 MG/ML
4 INJECTION INTRAMUSCULAR; INTRAVENOUS
Status: CANCELLED | OUTPATIENT
Start: 2022-10-25 | End: 2022-10-26

## 2022-10-25 RX ORDER — LIDOCAINE HYDROCHLORIDE 20 MG/ML
INJECTION, SOLUTION EPIDURAL; INFILTRATION; INTRACAUDAL; PERINEURAL PRN
Status: DISCONTINUED | OUTPATIENT
Start: 2022-10-25 | End: 2022-10-25 | Stop reason: SDUPTHER

## 2022-10-25 RX ORDER — SODIUM CHLORIDE 0.9 % (FLUSH) 0.9 %
5-40 SYRINGE (ML) INJECTION EVERY 12 HOURS SCHEDULED
Status: DISCONTINUED | OUTPATIENT
Start: 2022-10-25 | End: 2022-10-25 | Stop reason: HOSPADM

## 2022-10-25 RX ORDER — SODIUM CHLORIDE 9 MG/ML
INJECTION, SOLUTION INTRAVENOUS PRN
Status: CANCELLED | OUTPATIENT
Start: 2022-10-25

## 2022-10-25 RX ORDER — SODIUM CHLORIDE 0.9 % (FLUSH) 0.9 %
5-40 SYRINGE (ML) INJECTION EVERY 12 HOURS SCHEDULED
Status: CANCELLED | OUTPATIENT
Start: 2022-10-25

## 2022-10-25 RX ORDER — SODIUM CHLORIDE 0.9 % (FLUSH) 0.9 %
5-40 SYRINGE (ML) INJECTION PRN
Status: CANCELLED | OUTPATIENT
Start: 2022-10-25

## 2022-10-25 RX ADMIN — LIDOCAINE HYDROCHLORIDE 80 MG: 20 INJECTION, SOLUTION EPIDURAL; INFILTRATION; INTRACAUDAL; PERINEURAL at 09:25

## 2022-10-25 RX ADMIN — PROPOFOL 100 MG: 10 INJECTION, EMULSION INTRAVENOUS at 09:25

## 2022-10-25 RX ADMIN — SODIUM CHLORIDE 100 ML/HR: 9 INJECTION, SOLUTION INTRAVENOUS at 08:53

## 2022-10-25 RX ADMIN — PROPOFOL 120 MCG/KG/MIN: 10 INJECTION, EMULSION INTRAVENOUS at 09:26

## 2022-10-25 ASSESSMENT — PAIN SCALES - GENERAL
PAINLEVEL_OUTOF10: 0

## 2022-10-25 ASSESSMENT — LIFESTYLE VARIABLES: SMOKING_STATUS: 0

## 2022-10-25 ASSESSMENT — ENCOUNTER SYMPTOMS: SHORTNESS OF BREATH: 0

## 2022-10-25 ASSESSMENT — PAIN - FUNCTIONAL ASSESSMENT: PAIN_FUNCTIONAL_ASSESSMENT: NONE - DENIES PAIN

## 2022-10-25 NOTE — H&P
[facility]   Pre-operative History and Physical    Patient: Keesha Tracy  : 1960  Acct#:         HISTORY OF PRESENT ILLNESS:    The patient is a 63 y.o. female  who presents with epigastric pain  Past Medical History:        Diagnosis Date    Diverticulitis     Dysmenorrhea     Heavy periods     Irregular uterine bleeding     Nerve damage     Ovarian cyst      Past Surgical History:        Procedure Laterality Date     SECTION      COLONOSCOPY      HERNIA REPAIR      OVARIAN CYST REMOVAL      THYROID SURGERY      TUBAL LIGATION       Medications prior to admission:   Prior to Admission medications    Medication Sig Start Date End Date Taking? Authorizing Provider   nitrofurantoin, macrocrystal-monohydrate, (MACROBID) 100 MG capsule Take 100 mg by mouth 2 times daily   Yes Historical Provider, MD   acetaminophen (TYLENOL) 500 MG tablet Take 2 tablets by mouth 4 times daily as needed for Pain 3/11/21   WILLIAMS Nava CNP   meloxicam (MOBIC) 7.5 MG tablet Take 1 tablet by mouth daily 20  Josr Hernández MD     Allergies:    Nsaids and Orphenadrine citrate  Social History:   Social History     Socioeconomic History    Marital status:       Spouse name: Not on file    Number of children: Not on file    Years of education: Not on file    Highest education level: Not on file   Occupational History    Not on file   Tobacco Use    Smoking status: Former     Packs/day: 0.50     Years: 5.00     Pack years: 2.50     Types: Cigarettes     Quit date:      Years since quittin.8    Smokeless tobacco: Never   Vaping Use    Vaping Use: Never used   Substance and Sexual Activity    Alcohol use: Yes     Comment: occasionally    Drug use: No    Sexual activity: Yes     Partners: Male   Other Topics Concern    Not on file   Social History Narrative    Not on file     Social Determinants of Health     Financial Resource Strain: Not on file   Food Insecurity: Not on file Transportation Needs: Not on file   Physical Activity: Not on file   Stress: Not on file   Social Connections: Not on file   Intimate Partner Violence: Not on file   Housing Stability: Not on file           Family History:   Family History   Problem Relation Age of Onset    Cancer Sister 61        uterine         PHYSICAL EXAM:      /76   Pulse 73   Temp 97.6 °F (36.4 °C) (Temporal)   Resp 16   Ht 5' 4\" (1.626 m)   Wt 133 lb (60.3 kg)   SpO2 98%   BMI 22.83 kg/m²  I        Heart: Normal    Lungs: Normal    Abdomen: Normal      ASA Grade: ASA 2 - Patient with mild systemic disease with no functional limitations    II (soft palate, uvula, fauces visible)  ASSESSMENT AND PLAN:    1. Patient is a 63 y.o. female here for EGD  2. Procedure options, risks and benefits reviewed with patient who expresses understanding.

## 2022-10-25 NOTE — DISCHARGE INSTRUCTIONS
[facility] Endoscopy MOB Discharge Instructions   EGD (Esophagogastroduodenoscopy)    NAME:  Bonnie Leggett  YOB: 1960  MEDICAL RECORD NUMBER:  [id number]  DATE:  10/25/2022      After receiving Propofol (Diprivan) for Moderate Sedation:    Do not drive or operate any machinery until tomorrow  Do not sign any legal documents or make any critical decisions  Do not drink alcoholic beverages for 24 hours  Plan to spend a few hours resting before resuming your normal routine  Possible side effects are light headedness and sedation    You may resume your usual diet at home    Resume all your daily medications    Call your physician if any of the following occur: If you have any difficulty breathing: CALL 911  Neck swelling  Difficulty swallowing  Excessive pain or abdominal distention  Fever, chills, nausea or vomiting    If you are unable to reach your physician or symptoms worsen, proceed to the nearest Emergency Room    You may use warm salt water gargles, lozenges or Chloraseptic spray as needed for your sore throat. Biopsy Obtained: NO    Recommendations: The patient will now be scheduled for a CT scan of the abdomen. For questions or concerns please contact your GI physician's 24 hour call center at [phone].

## 2022-10-25 NOTE — BRIEF OP NOTE
0Brief Postoperative Note    Simi Voss  YOB: 1960  [id number]    Pre-operative Diagnosis: Epigastric pain    Post-operative Diagnosis: Same    Procedure: EGD    Anesthesia: MAC    Surgeons/Assistants: Singh Driscoll MD    Estimated Blood Loss: None    Complications: None    Specimens: Was Not Obtained    Findings: See dictated report    Electronically signed by Snigh Driscoll MD on 10/25/2022 at 9:41 AM

## 2022-10-25 NOTE — ANESTHESIA PRE PROCEDURE
Department of Anesthesiology  Preprocedure Note       Name:  Alex Bain   Age:  63 y.o.  :  1960                                          MRN:  [id number]         Date:  10/25/2022      Surgeon: Glynn Squires):  Davina Carpenter MD    Procedure: Procedure(s):  EGD ESOPHAGOGASTRODUODENOSCOPY    Medications prior to admission:   Prior to Admission medications    Medication Sig Start Date End Date Taking? Authorizing Provider   nitrofurantoin, macrocrystal-monohydrate, (MACROBID) 100 MG capsule Take 100 mg by mouth 2 times daily   Yes Historical Provider, MD   acetaminophen (TYLENOL) 500 MG tablet Take 2 tablets by mouth 4 times daily as needed for Pain 3/11/21   WILLIAMS Singh - CNP   meloxicam (MOBIC) 7.5 MG tablet Take 1 tablet by mouth daily 20 0  Kylee Fletcher MD       Current medications:    Current Facility-Administered Medications   Medication Dose Route Frequency Provider Last Rate Last Admin    sodium chloride flush 0.9 % injection 5-40 mL  5-40 mL IntraVENous 2 times per day Lani Brennan MD        sodium chloride flush 0.9 % injection 5-40 mL  5-40 mL IntraVENous PRN Lani Brennan MD        0.9 % sodium chloride infusion   IntraVENous PRN Lani Brennan  mL/hr at 10/25/22 0901 NoRateChange at 10/25/22 09       Allergies: Allergies   Allergen Reactions    Nsaids      Makes my heart hurt    Orphenadrine Citrate Other (See Comments)     NORFLEX       Problem List:    Patient Active Problem List   Diagnosis Code    Nerve damage T14. 8XXA    Tobacco abuse Z72.0    Ovarian cyst N83.209       Past Medical History:        Diagnosis Date    Diverticulitis     Dysmenorrhea     Heavy periods     Irregular uterine bleeding     Nerve damage     Ovarian cyst        Past Surgical History:        Procedure Laterality Date     SECTION      COLONOSCOPY      HERNIA REPAIR      OVARIAN CYST REMOVAL      THYROID SURGERY      TUBAL LIGATION         Social History: Social History     Tobacco Use    Smoking status: Former     Packs/day: 0.50     Years: 5.00     Pack years: 2.50     Types: Cigarettes     Quit date: 2020     Years since quittin.8    Smokeless tobacco: Never   Substance Use Topics    Alcohol use: Yes     Comment: occasionally                                Counseling given: Not Answered      Vital Signs (Current):   Vitals:    10/18/22 1249 10/25/22 0844 10/25/22 0849   BP:   113/76   Pulse:   73   Resp:   16   Temp:   97.6 °F (36.4 °C)   TempSrc:   Temporal   SpO2:   98%   Weight: 133 lb (60.3 kg) 133 lb (60.3 kg)    Height: 5' 4\" (1.626 m) 5' 4\" (1.626 m)                                               BP Readings from Last 3 Encounters:   10/25/22 113/76   21 125/70   20 136/82       NPO Status: Time of last liquid consumption:                         Time of last solid consumption:                         Date of last liquid consumption: 10/24/22                        Date of last solid food consumption: 10/24/22    BMI:   Wt Readings from Last 3 Encounters:   10/25/22 133 lb (60.3 kg)   21 133 lb 9.6 oz (60.6 kg)   20 127 lb 13.9 oz (58 kg)     Body mass index is 22.83 kg/m².     CBC:   Lab Results   Component Value Date/Time    WBC 5.4 2021 10:45 AM    RBC 4.29 2021 10:45 AM    HGB 13.8 2021 10:45 AM    HCT 40.0 2021 10:45 AM    MCV 93.3 2021 10:45 AM    RDW 12.7 2021 10:45 AM     2021 10:45 AM       CMP:   Lab Results   Component Value Date/Time     2021 10:45 AM    K 3.7 2021 10:45 AM     2021 10:45 AM    CO2 25 2021 10:45 AM    BUN 5 2021 10:45 AM    CREATININE <0.5 2021 10:45 AM    GFRAA >60 2021 10:45 AM    GFRAA >60 2013 01:05 PM    AGRATIO 1.7 2021 10:45 AM    LABGLOM >60 2021 10:45 AM    LABGLOM 108 04/15/2011 08:50 AM    GLUCOSE 112 2021 10:45 AM    PROT 7.0 2021 10:45 AM    PROT 6.9 02/08/2013 01:05 PM    CALCIUM 9.1 03/11/2021 10:45 AM    BILITOT 0.4 03/11/2021 10:45 AM    ALKPHOS 67 03/11/2021 10:45 AM    AST 14 03/11/2021 10:45 AM    ALT 11 03/11/2021 10:45 AM       POC Tests: No results for input(s): POCGLU, POCNA, POCK, POCCL, POCBUN, POCHEMO, POCHCT in the last 72 hours. Coags: No results found for: PROTIME, INR, APTT    HCG (If Applicable): No results found for: PREGTESTUR, PREGSERUM, HCG, HCGQUANT     ABGs: No results found for: PHART, PO2ART, OIK8WGZ, ZUH1PZF, BEART, I2NJQGLX     Type & Screen (If Applicable):  No results found for: LABABO, LABRH    Drug/Infectious Status (If Applicable):  No results found for: HIV, HEPCAB    COVID-19 Screening (If Applicable): No results found for: COVID19        Anesthesia Evaluation  Patient summary reviewed no history of anesthetic complications:   Airway: Mallampati: II  TM distance: >3 FB   Neck ROM: full  Mouth opening: > = 3 FB   Dental: normal exam         Pulmonary:Negative Pulmonary ROS       (-) shortness of breath and not a current smoker          Patient did not smoke on day of surgery. Cardiovascular:Negative CV ROS        (-) pacemaker, past MI, CABG/stent and  angina       Beta Blocker:  Not on Beta Blocker         Neuro/Psych:   Negative Neuro/Psych ROS     (-) seizures and CVA           GI/Hepatic/Renal: Neg GI/Hepatic/Renal ROS       (-) liver disease and no renal disease       Endo/Other: Negative Endo/Other ROS       (-) diabetes mellitus, hypothyroidism, hyperthyroidism               Abdominal:             Vascular: negative vascular ROS. Other Findings:           Anesthesia Plan      MAC     ASA 2       Induction: intravenous. Anesthetic plan and risks discussed with patient. Plan discussed with CRNA.                   This pre-anesthesia assessment may be used as a history and physical.    DOS STAFF ADDENDUM:    Pt seen and examined, chart reviewed (including anesthesia, drug and allergy history). No interval changes to history and physical examination. Anesthetic plan, risks, benefits, alternatives, and personnel involved discussed with patient. Patient verbalized an understanding and agrees to proceed.       Jonah Kaiser MD  October 25, 2022  9:15 AM

## 2022-10-25 NOTE — OP NOTE
Operative Note      Patient: Bonnie Leggett  YOB: 1960  MRN: [id number]    Date of Procedure: 10/25/2022    Pre-Op Diagnosis: Epigastric pain    Post-Op Diagnosis: Same       Procedure(s):  EGD ESOPHAGOGASTRODUODENOSCOPY    Surgeon(s):  Lorena Sanchez MD    Assistant:   * No surgical staff found *    Anesthesia: Monitor Anesthesia Care    Estimated Blood Loss (mL): None    Complications: None    Specimens:   * No specimens in log *    Implants:  * No implants in log *      Drains: * No LDAs found *    Findings: See dictated report    Detailed Description of Procedure:   See dictated report    Electronically signed by Lorena Sanchez MD on 10/25/2022 at 9:43 AM

## 2022-11-17 ENCOUNTER — HOSPITAL ENCOUNTER (OUTPATIENT)
Dept: CT IMAGING | Age: 62
Discharge: HOME | End: 2022-11-17
Payer: COMMERCIAL

## 2022-11-17 DIAGNOSIS — R10.13 EPIGASTRIC PAIN: ICD-10-CM

## 2022-11-17 DIAGNOSIS — Z80.0 FAMILY HISTORY OF PANCREATIC CANCER: ICD-10-CM

## 2022-11-17 PROCEDURE — 6360000004 HC RX CONTRAST MEDICATION: Performed by: EMERGENCY MEDICINE

## 2022-11-17 PROCEDURE — 74178 CT ABD&PLV WO CNTR FLWD CNTR: CPT

## 2022-11-17 RX ADMIN — IOPAMIDOL 75 ML: 755 INJECTION, SOLUTION INTRAVENOUS at 13:58

## 2023-08-23 ENCOUNTER — APPOINTMENT (OUTPATIENT)
Dept: GENERAL RADIOLOGY | Age: 63
End: 2023-08-23
Payer: COMMERCIAL

## 2023-08-23 ENCOUNTER — HOSPITAL ENCOUNTER (EMERGENCY)
Age: 63
Discharge: HOME | End: 2023-08-23
Attending: EMERGENCY MEDICINE
Payer: COMMERCIAL

## 2023-08-23 VITALS
RESPIRATION RATE: 17 BRPM | OXYGEN SATURATION: 98 % | SYSTOLIC BLOOD PRESSURE: 127 MMHG | HEART RATE: 79 BPM | DIASTOLIC BLOOD PRESSURE: 70 MMHG | HEIGHT: 64 IN | BODY MASS INDEX: 22.85 KG/M2 | WEIGHT: 133.82 LBS | TEMPERATURE: 98.3 F

## 2023-08-23 DIAGNOSIS — S99.911A INJURY OF RIGHT ANKLE, INITIAL ENCOUNTER: Primary | ICD-10-CM

## 2023-08-23 PROCEDURE — 99283 EMERGENCY DEPT VISIT LOW MDM: CPT

## 2023-08-23 PROCEDURE — 73610 X-RAY EXAM OF ANKLE: CPT

## 2023-08-23 PROCEDURE — 6370000000 HC RX 637 (ALT 250 FOR IP): Performed by: EMERGENCY MEDICINE

## 2023-08-23 RX ORDER — LIDOCAINE 4 G/G
1 PATCH TOPICAL DAILY
Status: DISCONTINUED | OUTPATIENT
Start: 2023-08-23 | End: 2023-08-23 | Stop reason: HOSPADM

## 2023-08-23 RX ORDER — ACETAMINOPHEN 500 MG
1000 TABLET ORAL 3 TIMES DAILY
Qty: 30 TABLET | Refills: 0 | Status: SHIPPED | OUTPATIENT
Start: 2023-08-23 | End: 2023-08-28

## 2023-08-23 RX ORDER — LIDOCAINE 4 G/G
1 PATCH TOPICAL NIGHTLY
Qty: 5 EACH | Refills: 0 | Status: SHIPPED | OUTPATIENT
Start: 2023-08-23 | End: 2023-08-28

## 2023-08-23 ASSESSMENT — PAIN SCALES - GENERAL: PAINLEVEL_OUTOF10: 5

## 2023-08-23 ASSESSMENT — PAIN - FUNCTIONAL ASSESSMENT: PAIN_FUNCTIONAL_ASSESSMENT: 0-10

## 2023-08-23 NOTE — ED NOTES
Pt discharged at this time. Discharge instructions and medications reviewed,  Questions were answered. PT verbalized understanding. VSS, Afebrile. Follow up appointments were discussed.          Paulina Mac  08/23/23 9371

## 2023-08-23 NOTE — DISCHARGE INSTRUCTIONS
Your prescription has been sent to [facility]. Be sure to contact your primary care provider's office to arrange for a follow up visit. Apply ice or cool packs to your ankle for 5-10 minutes 3-4 times per day. If you have any new or worsening issues after going home don't hesitate to return here for reevaluation at any time 24/7!

## 2023-08-25 ENCOUNTER — HOSPITAL ENCOUNTER (EMERGENCY)
Age: 63
Discharge: HOME | End: 2023-08-25
Payer: COMMERCIAL

## 2023-08-25 ENCOUNTER — APPOINTMENT (OUTPATIENT)
Dept: GENERAL RADIOLOGY | Age: 63
End: 2023-08-25
Payer: COMMERCIAL

## 2023-08-25 VITALS
HEART RATE: 93 BPM | TEMPERATURE: 98.4 F | OXYGEN SATURATION: 98 % | SYSTOLIC BLOOD PRESSURE: 134 MMHG | DIASTOLIC BLOOD PRESSURE: 78 MMHG | HEIGHT: 64 IN | RESPIRATION RATE: 16 BRPM | WEIGHT: 138.01 LBS | BODY MASS INDEX: 23.56 KG/M2

## 2023-08-25 DIAGNOSIS — S39.012A BACK STRAIN, INITIAL ENCOUNTER: Primary | ICD-10-CM

## 2023-08-25 PROCEDURE — 72072 X-RAY EXAM THORAC SPINE 3VWS: CPT

## 2023-08-25 PROCEDURE — 6370000000 HC RX 637 (ALT 250 FOR IP): Performed by: PHYSICIAN ASSISTANT

## 2023-08-25 PROCEDURE — 71101 X-RAY EXAM UNILAT RIBS/CHEST: CPT

## 2023-08-25 PROCEDURE — 99283 EMERGENCY DEPT VISIT LOW MDM: CPT

## 2023-08-25 RX ORDER — LIDOCAINE 4 G/G
1 PATCH TOPICAL DAILY
Status: DISCONTINUED | OUTPATIENT
Start: 2023-08-25 | End: 2023-08-25 | Stop reason: HOSPADM

## 2023-08-25 ASSESSMENT — LIFESTYLE VARIABLES
HOW OFTEN DO YOU HAVE A DRINK CONTAINING ALCOHOL: MONTHLY OR LESS
HOW MANY STANDARD DRINKS CONTAINING ALCOHOL DO YOU HAVE ON A TYPICAL DAY: 1 OR 2

## 2023-08-25 ASSESSMENT — PAIN SCALES - GENERAL: PAINLEVEL_OUTOF10: 4

## 2023-08-25 ASSESSMENT — PAIN - FUNCTIONAL ASSESSMENT: PAIN_FUNCTIONAL_ASSESSMENT: 0-10

## 2023-08-25 NOTE — ED PROVIDER NOTES
[address] [facility] [address] [address]        Pt Name: Irina Mcgrath  MRN: [id number]  [address] [facility] 1960  Date of evaluation: 2023  Provider: ANTHONY Daniels  PCP: Britton Melgar MD  Note Started: 10:34 AM EDT 23      MARISEL. I have evaluated this patient. CHIEF COMPLAINT       Chief Complaint   Patient presents with    Back Pain     Was seen here few days ago for this but with different s/s (work injury). Given air cast on ankle. Woke up with back pain/ right buttocks. Sharp pain. Mostly painful when she lays down & takes a deep breath       HISTORY OF PRESENT ILLNESS: 1 or more Elements     History From: patient    Irina Mcgrath is a 66 y.o. female who presents for right mid back pain that started yesterday. She had a work injury few days ago. She was pulling a large cart with patient food trays in it with her right arm when her right ankle got caught under the cart. Caused her right arm to jerk backwards. She was seen in ED afterward for the right ankle pain. Did not have back pain at that time. She started with back pain yesterday that is worse with movement and inspiration. Denies chest pain ro fever. She has been taking Tylenol. Was also given lidocaine patches for the ankle. She did not fall with the injury. She was supposed to return to work today. Nursing Notes were reviewed and agreed with or any disagreements were addressed in the HPI. REVIEW OF SYSTEMS :      Review of Systems    Positives and Pertinent negatives as per HPI.      SURGICAL HISTORY     Past Surgical History:   Procedure Laterality Date     SECTION      COLONOSCOPY      HERNIA REPAIR      OVARIAN CYST REMOVAL      THYROID SURGERY      TUBAL LIGATION      UPPER GASTROINTESTINAL ENDOSCOPY N/A 10/25/2022    EGD ESOPHAGOGASTRODUODENOSCOPY performed by Steven Aragon MD at [address] [place] [address]       Previous

## 2023-09-26 ENCOUNTER — HOSPITAL ENCOUNTER (OUTPATIENT)
Dept: PHYSICAL THERAPY | Age: 63
Setting detail: THERAPIES SERIES
Discharge: HOME | End: 2023-09-26
Payer: COMMERCIAL

## 2023-09-26 PROCEDURE — 97530 THERAPEUTIC ACTIVITIES: CPT

## 2023-09-26 PROCEDURE — 97161 PT EVAL LOW COMPLEX 20 MIN: CPT

## 2023-09-26 PROCEDURE — 97110 THERAPEUTIC EXERCISES: CPT

## 2023-09-26 NOTE — FLOWSHEET NOTE
[address] [place] [address]  Phone: [phone]   Fax: [phone]    Physical Therapy Daily Treatment Note  Date:  2023    Patient Name:  Valorie Valle    :  1960  MRN: [id number]  Restrictions/Precautions:    Medical/Treatment Diagnosis Information:  Diagnosis: S90.01XA (ICD-10-CM) - Contusion of right ankle  S29.012A (ICD-10-CM) - Strain of muscle and tendon of back wall of thorax, initial encounter  S20.229A (ICD-10-CM) - Contusion of back wall of thorax  Treatment Diagnosis: S90.01XA (ICD-10-CM) - Contusion of right ankle  S29.012A (ICD-10-CM) - Strain of muscle and tendon of back wall of thorax, initial encounter  S20.229A (ICD-10-CM) - Contusion of back wall of thorax  Insurance/Certification information:  PT Insurance Information: [facility]  Physician Information:  Referring Provider (secondary): Adrián Frank MD  Plan of care signed (Y/N): []  Yes  []  No     Date of Patient follow up with Physician:     Is this a Progress Report:     []  Yes  [x]  No        If Yes:  Date Range for reporting period:  Beginnin/26  Ending    Progress report will be due (10 Rx or 30 days whichever is less):        Recertification will be due (POC Duration  / 90 days whichever is less): see above         Visit # Insurance Allowable Auth Required    C9: 12 visits  (-10/30) [x]  Yes []  No        Functional Scale:     OUTCOME MEASURE DATE DEFICIT   WOMAC (modified scoring)  IE 63% Deficit         Latex Allergy:  [x]NO      []YES  Preferred Language for Healthcare:   [x]English       []other:      Pain level:  1/10    SUBJECTIVE:  See eval    OBJECTIVE:   See eval     ROM LEFT RIGHT*           Knee ext       Knee Flex       DF knee bent       DF knee straight       Ankle inversion  22 25   Ankle eversion  18 18   Ankle PF 52 52   Ankle DF Lacking 4 Lacking 5, \"tight\"    Great toe ext       Hamstring 50%

## 2023-09-28 ENCOUNTER — HOSPITAL ENCOUNTER (OUTPATIENT)
Dept: PHYSICAL THERAPY | Age: 63
Setting detail: THERAPIES SERIES
Discharge: HOME | End: 2023-09-28
Payer: COMMERCIAL

## 2023-09-28 PROCEDURE — 97161 PT EVAL LOW COMPLEX 20 MIN: CPT

## 2023-09-28 PROCEDURE — 97140 MANUAL THERAPY 1/> REGIONS: CPT

## 2023-09-28 NOTE — FLOWSHEET NOTE
[address]  Phone: [phone]   Fax: [phone]    Physical Therapy Daily Treatment Note  Date:  2023    Patient Name:  Jacqueline Del Rosario    :  1960  MRN: [id number]  Restrictions/Precautions:    Medical/Treatment Diagnosis Information:  Diagnosis: S90.01XA (ICD-10-CM) - Contusion of right ankle  S29.012A (ICD-10-CM) - Strain of muscle and tendon of back wall of thorax, initial encounter  S20.229A (ICD-10-CM) - Contusion of back wall of thorax  Treatment Diagnosis: S90.01XA (ICD-10-CM) - Contusion of right ankle  S29.012A (ICD-10-CM) - Strain of muscle and tendon of back wall of thorax, initial encounter  S20.229A (ICD-10-CM) - Contusion of back wall of thorax  Insurance/Certification information:  PT Insurance Information: [facility]  Physician Information:  Referring Provider (secondary): Stu Krishnamurthy MD  Plan of care signed (Y/N): []  Yes  []  No     Date of Patient follow up with Physician:     Is this a Progress Report:     []  Yes  [x]  No        If Yes:  Date Range for reporting period:  Beginnin/26  Ending    Progress report will be due (10 Rx or 30 days whichever is less):        Recertification will be due (POC Duration  / 90 days whichever is less): see above         Visit # Insurance Allowable Auth Required    C9: 12 visits  (-10/30) [x]  Yes []  No        Functional Scale:     OUTCOME MEASURE DATE DEFICIT   WOMAC (modified scoring)  NORMA  IE (ankle)   (Thoracic)  63% Deficit  18% Deficit          Latex Allergy:  [x]NO      []YES  Preferred Language for Healthcare:   [x]English       []other:      Pain level: /10 mid back     SUBJECTIVE:  See eval    OBJECTIVE:        ROM LEFT RIGHT*           Knee ext       Knee Flex       DF knee bent       DF knee straight       Ankle inversion  22 25   Ankle eversion  18 18   Ankle PF 52 52   Ankle DF Lacking 4 Lacking 5,

## 2023-10-02 ENCOUNTER — HOSPITAL ENCOUNTER (OUTPATIENT)
Dept: PHYSICAL THERAPY | Age: 63
Setting detail: THERAPIES SERIES
End: 2023-10-02
Payer: COMMERCIAL

## 2023-10-03 ENCOUNTER — TELEPHONE (OUTPATIENT)
Dept: PHYSICAL THERAPY | Age: 63
End: 2023-10-03

## 2023-10-03 NOTE — TELEPHONE ENCOUNTER
Patient called this date requesting me to amend the evaluation note from 9/28 to further specify:  - The shoulder pain she is under different PT treatment for is unrelated to worker's compensation.  - The patient would like to focus on upper back \"reconditioning\" to return to work. I do not feel it appropriate to amend these comments as I believe my evaluation to be an accurate and correct depiction of the 9/28 visit.      Drew Dunn, PT, DPT, cert DN

## 2023-10-04 ENCOUNTER — APPOINTMENT (OUTPATIENT)
Dept: PHYSICAL THERAPY | Age: 63
End: 2023-10-04
Payer: COMMERCIAL

## 2023-10-10 ENCOUNTER — HOSPITAL ENCOUNTER (OUTPATIENT)
Dept: PHYSICAL THERAPY | Age: 63
Setting detail: THERAPIES SERIES
Discharge: HOME | End: 2023-10-10
Payer: COMMERCIAL

## 2023-10-10 PROCEDURE — 97530 THERAPEUTIC ACTIVITIES: CPT

## 2023-10-10 PROCEDURE — 97112 NEUROMUSCULAR REEDUCATION: CPT

## 2023-10-10 PROCEDURE — 97110 THERAPEUTIC EXERCISES: CPT

## 2023-10-10 NOTE — FLOWSHEET NOTE
balance, coordination, kinesthetic sense, posture, motor skill, proprioception of core, proximal hip and LE for self care, mobility, lifting, and ambulation/stair navigation      Manual Treatments:  PROM / STM / Oscillations-Mobs:  G-I, II, III, IV (PA's, Inf., Post.)  [] (62092) Provided manual therapy to mobilize LE, proximal hip and/or LS spine soft tissue/joints for the purpose of modulating pain, promoting relaxation,  increasing ROM, reducing/eliminating soft tissue swelling/inflammation/restriction, improving soft tissue extensibility and allowing for proper ROM for normal function with self care, mobility, lifting and ambulation. Modalities:      [] GAME READY (VASO)- for significant edema, swelling, pain control. Charges:  Timed Code Treatment Minutes: 54'   Total Treatment Minutes: 61'   929-1030   [] EVAL (LOW) 41612 (typically 20 minutes face-to-face)  [] EVAL (MOD) 16679 (typically 30 minutes face-to-face)  [] EVAL (HIGH) 93855 (typically 45 minutes face-to-face)  [] RE-EVAL     [x] NR(17931) x 1 (929-951, 22')    [] IONTO  [x] NMR (27019) x  1  (951-1001, 10') [] VASO  [] Manual (63625) x     [] Other:  [x] TA x  2 (3605-5186, 23')     [] Mech Traction (81588)  [] ES(attended) (58455)      [] ES (un) (07050):      ASSESSMENT:  See eval    HEP instruction:  Access Code: [email or web address]  URL: [email or web address]  Date: 09/26/2023  Prepared by: Arpita Hong  (See scanned forms) ankle     Access Code: [email or web address]  URL: [facility]/  Date: 10/10/2023  Prepared by: Arpita Hong  (See scanned forms)  back      GOALS:  Patient stated goal: return to work     Therapist goals for Patient:   Short Term Goals: To be achieved in: 2 weeks  1. Independent in HEP and progression per patient tolerance, in order to prevent re-injury. [] Progressing: [] Met: [] Not Met: [] Adjusted   2.  Patient will have a decrease in pain to facilitate improvement in movement, function, and ADLs as indicated

## 2023-10-12 ENCOUNTER — HOSPITAL ENCOUNTER (OUTPATIENT)
Dept: PHYSICAL THERAPY | Age: 63
Setting detail: THERAPIES SERIES
Discharge: HOME | End: 2023-10-12
Payer: COMMERCIAL

## 2023-10-12 PROCEDURE — 97110 THERAPEUTIC EXERCISES: CPT

## 2023-10-12 PROCEDURE — 97530 THERAPEUTIC ACTIVITIES: CPT

## 2023-10-12 PROCEDURE — 97112 NEUROMUSCULAR REEDUCATION: CPT

## 2023-10-12 NOTE — FLOWSHEET NOTE
[address] [place] [address]  Phone: [phone]   Fax: [phone]    Physical Therapy Daily Treatment Note  Date:  10/12/2023    Patient Name:  Brando Cross    :  1960  MRN: [id number]  Restrictions/Precautions:    Medical/Treatment Diagnosis Information:  Diagnosis: S90.01XA (ICD-10-CM) - Contusion of right ankle  S29.012A (ICD-10-CM) - Strain of muscle and tendon of back wall of thorax, initial encounter  S20.229A (ICD-10-CM) - Contusion of back wall of thorax  Treatment Diagnosis: S90.01XA (ICD-10-CM) - Contusion of right ankle  S29.012A (ICD-10-CM) - Strain of muscle and tendon of back wall of thorax, initial encounter  S20.229A (ICD-10-CM) - Contusion of back wall of thorax  Insurance/Certification information:  PT Insurance Information: [facility]  Physician Information:  Referring Provider (secondary): Ish Collins MD  Plan of care signed (Y/N): [x]  Yes  []  No     Date of Patient follow up with Physician:     Is this a Progress Report:     []  Yes  [x]  No        If Yes:  Date Range for reporting period:  Beginnin/26  Ending    Progress report will be due (10 Rx or 30 days whichever is less):        Recertification will be due (POC Duration  / 90 days whichever is less): see above         Visit # Insurance Allowable Auth Required    C9: 12 visits  (-10/30) [x]  Yes []  No        Functional Scale:     OUTCOME MEASURE DATE DEFICIT   WOMAC (modified scoring)  NORMA  IE (ankle)   (Thoracic)  63% Deficit  18% Deficit          Latex Allergy:  [x]NO      []YES  Preferred Language for Healthcare:   [x]English       []other:      Pain level:  4/10 mid back, 5/10 R ankle     SUBJECTIVE: Patient states she aggravated her ankle on  (though she was on clinic 10/10 w/o reporting) when she was walking and she went turn and her \"leg went before her ankle\".  She reports she reached out to the

## 2023-10-16 ENCOUNTER — HOSPITAL ENCOUNTER (OUTPATIENT)
Dept: PHYSICAL THERAPY | Age: 63
Setting detail: THERAPIES SERIES
Discharge: HOME | End: 2023-10-16
Payer: COMMERCIAL

## 2023-10-16 ENCOUNTER — APPOINTMENT (OUTPATIENT)
Dept: PHYSICAL THERAPY | Age: 63
End: 2023-10-16
Payer: COMMERCIAL

## 2023-10-16 NOTE — FLOWSHEET NOTE
[address] [facility]        Physical Therapy   Phone: [phone]   Fax: [phone]      Physical Therapy  Cancellation/No-show Note  Patient Name:  Amy Danielle  :  1960   Date:  10/16/2023  Cancelled visits to date: 1  No-shows to date: 0    For today's appointment patient:  [x]  Cancelled  []  Rescheduled appointment  []  No-show     Reason given by patient:  []  Patient ill  []  Conflicting appointment  []  No transportation    []  Conflict with work  []  No reason given  [x]  Other:     Comments:  time conflict     Electronically signed by:  Drea Zeng, PT, DPT

## 2023-10-17 ENCOUNTER — HOSPITAL ENCOUNTER (OUTPATIENT)
Dept: PHYSICAL THERAPY | Age: 63
Setting detail: THERAPIES SERIES
Discharge: HOME | End: 2023-10-17
Payer: COMMERCIAL

## 2023-10-17 PROCEDURE — 97112 NEUROMUSCULAR REEDUCATION: CPT

## 2023-10-17 PROCEDURE — 97110 THERAPEUTIC EXERCISES: CPT

## 2023-10-17 PROCEDURE — 97530 THERAPEUTIC ACTIVITIES: CPT

## 2023-10-17 NOTE — FLOWSHEET NOTE
[address] [place] [address]  Phone: [phone]   Fax: [phone]    Physical Therapy Daily Treatment Note  Date:  10/17/2023    Patient Name:  Marry Contreras    :  1960  MRN: [id number]  Restrictions/Precautions:    Medical/Treatment Diagnosis Information:  Diagnosis: S90.01XA (ICD-10-CM) - Contusion of right ankle  S29.012A (ICD-10-CM) - Strain of muscle and tendon of back wall of thorax, initial encounter  S20.229A (ICD-10-CM) - Contusion of back wall of thorax  Treatment Diagnosis: S90.01XA (ICD-10-CM) - Contusion of right ankle  S29.012A (ICD-10-CM) - Strain of muscle and tendon of back wall of thorax, initial encounter  S20.229A (ICD-10-CM) - Contusion of back wall of thorax  Insurance/Certification information:  PT Insurance Information: [address] [place] [address]  Physician Information:  Referring Provider (secondary): Nacho Duarte MD  Plan of care signed (Y/N): [x]  Yes  []  No     Date of Patient follow up with Physician: 10/18    Is this a Progress Report:     []  Yes  [x]  No        If Yes:  Date Range for reporting period:  Beginnin/26  Ending    Progress report will be due (10 Rx or 30 days whichever is less):        Recertification will be due (POC Duration  / 90 days whichever is less): see above         Visit # Insurance Allowable Auth Required    C9: 12 visits  (-10/30) [x]  Yes []  No        Functional Scale:     OUTCOME MEASURE DATE DEFICIT   WOMAC (modified scoring)  NORMA  IE (ankle)   (Thoracic)  63% Deficit  18% Deficit          Latex Allergy:  [x]NO      []YES  Preferred Language for Healthcare:   [x]English       []other:      Pain level: 4/10 mid back, 510 R ankle     SUBJECTIVE:  Patient states her R ankle remains painful. She states the HEP does make the ankle sore after. She states it may also be sore from increased activity at home, completing stairs to go to the bathroom and bed.  She

## 2023-10-19 ENCOUNTER — HOSPITAL ENCOUNTER (OUTPATIENT)
Dept: PHYSICAL THERAPY | Age: 63
Setting detail: THERAPIES SERIES
Discharge: HOME | End: 2023-10-19
Payer: COMMERCIAL

## 2023-10-19 PROCEDURE — 97110 THERAPEUTIC EXERCISES: CPT

## 2023-10-19 PROCEDURE — 97112 NEUROMUSCULAR REEDUCATION: CPT

## 2023-10-19 PROCEDURE — 97530 THERAPEUTIC ACTIVITIES: CPT

## 2023-10-19 NOTE — FLOWSHEET NOTE
brace as needed for her feelings of instability, though weaning from it when able will help to further functionally strengthening and build endurance. Patient also educated she is able to begin walking on paved trails or on the treadmill as tolerated, as she states she was not sure if she was able to do this task. Weight bearing progressions progressed this date. Moderate fatigued and difficulty noted with proprioceptive activities, notably the rocker board balance. Patient denied increase in ankle pain. Will continue to progress as tolerated in order to return to her pain free PLOF. Patient education: HEP, POC    PLAN: See eval  [] Continue per plan of care [] Alter current plan (see comments above)  [x] Plan of care initiated [] Hold pending MD visit [] Discharge    Electronically signed by: Diomedes Crowe, PT , DPT    Note: If patient does not return for scheduled/ recommended follow up visits, this note will serve as a discharge from care along with most recent update on progress.

## 2023-10-26 ENCOUNTER — HOSPITAL ENCOUNTER (OUTPATIENT)
Dept: PHYSICAL THERAPY | Age: 63
Setting detail: THERAPIES SERIES
Discharge: HOME | End: 2023-10-26
Payer: COMMERCIAL

## 2023-10-26 PROCEDURE — 97110 THERAPEUTIC EXERCISES: CPT

## 2023-10-26 PROCEDURE — 97530 THERAPEUTIC ACTIVITIES: CPT

## 2023-10-26 PROCEDURE — 97112 NEUROMUSCULAR REEDUCATION: CPT

## 2023-10-26 NOTE — PLAN OF CARE
Patient limited by other medical complications  [x] Other: Treatment time limited as progress note was due this date. Patient education: HEP, POC    PLAN:   [x] Continue per plan of care [] Alter current plan (see comments above)  [] Plan of care initiated [] Hold pending MD visit [] Discharge    Electronically signed by: Em Gaitan, PT , DPT    Note: If patient does not return for scheduled/ recommended follow up visits, this note will serve as a discharge from care along with most recent update on progress.

## 2023-10-30 ENCOUNTER — HOSPITAL ENCOUNTER (OUTPATIENT)
Dept: PHYSICAL THERAPY | Age: 63
Setting detail: THERAPIES SERIES
Discharge: HOME | End: 2023-10-30
Payer: COMMERCIAL

## 2023-10-30 PROCEDURE — 97530 THERAPEUTIC ACTIVITIES: CPT

## 2023-10-30 PROCEDURE — 97110 THERAPEUTIC EXERCISES: CPT

## 2023-10-30 PROCEDURE — 97112 NEUROMUSCULAR REEDUCATION: CPT

## 2023-10-30 NOTE — FLOWSHEET NOTE
[address] [place] [address]  Phone: [phone]   Fax: [phone]      Physical Therapy Daily Treatment Note  Date:  10/30/2023    Patient Name:  Nathalie Zaman    :  1960  MRN: [id number]  Restrictions/Precautions:    Medical/Treatment Diagnosis Information:  Diagnosis: S90.01XA (ICD-10-CM) - Contusion of right ankle  S29.012A (ICD-10-CM) - Strain of muscle and tendon of back wall of thorax, initial encounter  S20.229A (ICD-10-CM) - Contusion of back wall of thorax  Treatment Diagnosis: S90.01XA (ICD-10-CM) - Contusion of right ankle  S29.012A (ICD-10-CM) - Strain of muscle and tendon of back wall of thorax, initial encounter  S20.229A (ICD-10-CM) - Contusion of back wall of thorax  Insurance/Certification information:  PT Insurance Information: [facility]  Physician Information:  Referring Provider (secondary): Luisito Garcia MD  Plan of care signed (Y/N): [x]  Yes  []  No     Date of Patient follow up with Physician:      Is this a Progress Report:     [x]  Yes  []  No        If Yes:  Date Range for reporting period:  Beginnin/26  Ending: 10/26    Progress report will be due (10 Rx or 30 days whichever is less):        Recertification will be due (POC Duration  / 90 days whichever is less): see above         Visit # Insurance Allowable Auth Required    C9: 12 visits  (-10/30) [x]  Yes []  No        Functional Scale:     OUTCOME MEASURE DATE DEFICIT   WOMAC  NORMA 10/26  10/26 48.9% Deficit  14% Deficit    WOMAC (modified scoring)  NORMA  IE (ankle)   (Thoracic)  63% Deficit  18% Deficit          Latex Allergy:  [x]NO      []YES  Preferred Language for Healthcare:   [x]English       []other:      Pain level:  3.5/10 mid back, 3.5/10 R ankle     SUBJECTIVE: Patient states the outer ankle pain is reduced most times, though does fluctuate. Reports her rib cage feels better.        OBJECTIVE:

## 2023-11-01 ENCOUNTER — HOSPITAL ENCOUNTER (EMERGENCY)
Age: 63
Discharge: HOME | End: 2023-11-01
Attending: EMERGENCY MEDICINE
Payer: OTHER MISCELLANEOUS

## 2023-11-01 ENCOUNTER — APPOINTMENT (OUTPATIENT)
Dept: CT IMAGING | Age: 63
End: 2023-11-01
Attending: EMERGENCY MEDICINE
Payer: OTHER MISCELLANEOUS

## 2023-11-01 ENCOUNTER — APPOINTMENT (OUTPATIENT)
Dept: PHYSICAL THERAPY | Age: 63
End: 2023-11-01
Payer: COMMERCIAL

## 2023-11-01 ENCOUNTER — APPOINTMENT (OUTPATIENT)
Dept: GENERAL RADIOLOGY | Age: 63
End: 2023-11-01
Attending: EMERGENCY MEDICINE
Payer: OTHER MISCELLANEOUS

## 2023-11-01 ENCOUNTER — HOSPITAL ENCOUNTER (OUTPATIENT)
Dept: PHYSICAL THERAPY | Age: 63
Setting detail: THERAPIES SERIES
Discharge: HOME | End: 2023-11-01
Payer: COMMERCIAL

## 2023-11-01 VITALS
DIASTOLIC BLOOD PRESSURE: 58 MMHG | SYSTOLIC BLOOD PRESSURE: 109 MMHG | HEIGHT: 64 IN | OXYGEN SATURATION: 99 % | RESPIRATION RATE: 20 BRPM | HEART RATE: 83 BPM | TEMPERATURE: 98.9 F | BODY MASS INDEX: 22.58 KG/M2 | WEIGHT: 132.28 LBS
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
## 2023-11-01 DIAGNOSIS — S16.1XXA STRAIN OF NECK MUSCLE, INITIAL ENCOUNTER: Primary | ICD-10-CM

## 2023-11-01 PROCEDURE — 6370000000 HC RX 637 (ALT 250 FOR IP): Performed by: EMERGENCY MEDICINE

## 2023-11-01 PROCEDURE — 97110 THERAPEUTIC EXERCISES: CPT

## 2023-11-01 PROCEDURE — 97112 NEUROMUSCULAR REEDUCATION: CPT

## 2023-11-01 PROCEDURE — 97530 THERAPEUTIC ACTIVITIES: CPT

## 2023-11-01 PROCEDURE — 73080 X-RAY EXAM OF ELBOW: CPT

## 2023-11-01 PROCEDURE — 99284 EMERGENCY DEPT VISIT MOD MDM: CPT

## 2023-11-01 PROCEDURE — 72125 CT NECK SPINE W/O DYE: CPT

## 2023-11-01 PROCEDURE — 70450 CT HEAD/BRAIN W/O DYE: CPT

## 2023-11-01 RX ORDER — LIDOCAINE 50 MG/G
1 PATCH TOPICAL DAILY
Qty: 10 PATCH | Refills: 0 | Status: SHIPPED | OUTPATIENT
Start: 2023-11-01 | End: 2023-11-11

## 2023-11-01 RX ORDER — ACETAMINOPHEN 325 MG/1
650 TABLET ORAL ONCE
Status: COMPLETED | OUTPATIENT
Start: 2023-11-01 | End: 2023-11-01

## 2023-11-01 RX ADMIN — ACETAMINOPHEN 650 MG: 325 TABLET ORAL at 12:10

## 2023-11-01 ASSESSMENT — PAIN DESCRIPTION - DESCRIPTORS: DESCRIPTORS: ACHING

## 2023-11-01 ASSESSMENT — PAIN SCALES - GENERAL
PAINLEVEL_OUTOF10: 4
PAINLEVEL_OUTOF10: 4

## 2023-11-01 ASSESSMENT — PAIN - FUNCTIONAL ASSESSMENT: PAIN_FUNCTIONAL_ASSESSMENT: 0-10

## 2023-11-01 ASSESSMENT — PAIN DESCRIPTION - LOCATION: LOCATION: HEAD

## 2023-11-01 NOTE — DISCHARGE INSTRUCTIONS
1 Tylenol over-the-counter for pain and escalate prescribed medications if needed. 2.  Follow with your primary care physician the next 2 days call for an appointment. 3.  Return emergency department for severe worsening pain or focal neurologic complaints. 4.  Wear your seatbelt.
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
## 2023-11-01 NOTE — FLOWSHEET NOTE
assist with reaching prior level of function. [] Progressing: [] Met: [] Not Met: [] Adjusted  3. Patient will demonstrate increased ankle AROM to pain free 5 degrees of dorsiflexion to allow for proper joint functioning as indicated by patients Functional Deficits. [] Progressing: [] Met: [] Not Met: [] Adjusted  4. Patient will demonstrate an increase in ankle strength to 5/5 and pain free in LE to allow for proper functional mobility as indicated by patients Functional Deficits. [] Progressing: [] Met: [] Not Met: [] Adjusted  5. Patient will be able to ambulate 30 minutes without increased symptoms or restriction. [] Progressing: [] Met: [] Not Met: [] Adjusted  6. Patient will be able to push/pull 50# without increased symptoms or restriction in order to return to using carts at work. [] Progressing: [] Met: [] Not Met: [] Adjusted                        Overall Progression Towards Functional goals/ Treatment Progress Update:  [] Patient is progressing as expected towards functional goals listed. [] Progression is slowed due to complexities/Impairments listed. [] Progression has been slowed due to co-morbidities. [x] Plan just implemented, too soon to assess goals progression <30days   [] Goals require adjustment due to lack of progress  [] Patient is not progressing as expected and requires additional follow up with physician  [] Other    Prognosis for POC: [x] Good [] Fair  [] Poor      Patient requires continued skilled intervention: [x] Yes  [] No    Treatment/Activity Tolerance:  [] Patient able to complete treatment  [x] Patient limited by fatigue  [] Patient limited by pain     [] Patient limited by other medical complications  [x] Other: Patient tolerated session well. Improved ability to maintain upright position during SLS and wobble board balance. Theraband progressed to black band for inversion/eversion strengthening. Patient was given this band for HEP.  Patient will continue to be

## 2023-11-01 NOTE — ED NOTES
Patient ambulatory from ED. AVS provided and discussed with patient. All questions answered. Patient verbalizes understanding of discharge instructions. Respirations even and easy. No obvious distress at this time. Patient instructed to return to ED for any new or worsening symptoms. Patient verbalizes understanding.      Chester Hughes RN  11/01/23 7094

## 2023-11-06 ENCOUNTER — HOSPITAL ENCOUNTER (OUTPATIENT)
Dept: MRI IMAGING | Age: 63
Discharge: HOME | End: 2023-11-06
Payer: COMMERCIAL

## 2023-11-06 DIAGNOSIS — S29.012A STRAIN, DORSAL: ICD-10-CM

## 2023-11-06 DIAGNOSIS — S90.01XA CONTUSION OF RIGHT ANKLE, INITIAL ENCOUNTER: ICD-10-CM

## 2023-11-06 DIAGNOSIS — S20.229A CONTUSION OF FLANK AND BACK: ICD-10-CM

## 2023-11-06 DIAGNOSIS — S30.1XXA CONTUSION OF FLANK AND BACK: ICD-10-CM

## 2023-11-06 PROCEDURE — 73721 MRI JNT OF LWR EXTRE W/O DYE: CPT

## 2023-11-07 ENCOUNTER — APPOINTMENT (OUTPATIENT)
Dept: PHYSICAL THERAPY | Age: 63
End: 2023-11-07
Payer: COMMERCIAL

## 2023-11-09 ENCOUNTER — HOSPITAL ENCOUNTER (OUTPATIENT)
Dept: PHYSICAL THERAPY | Age: 63
Setting detail: THERAPIES SERIES
Discharge: HOME | End: 2023-11-09
Payer: COMMERCIAL

## 2023-11-09 PROCEDURE — 97110 THERAPEUTIC EXERCISES: CPT

## 2023-11-09 PROCEDURE — 97530 THERAPEUTIC ACTIVITIES: CPT

## 2023-11-09 PROCEDURE — 97112 NEUROMUSCULAR REEDUCATION: CPT

## 2023-11-09 ASSESSMENT — ENCOUNTER SYMPTOMS
BACK PAIN: 0
SHORTNESS OF BREATH: 0
ABDOMINAL PAIN: 0

## 2023-11-09 NOTE — FLOWSHEET NOTE
[] Not Met: [] Adjusted     Long Term Goals: To be achieved in: 6 weeks  1. Disability index score of 31.5% or less for the [facility] to assist with reaching prior level of function. [] Progressing: [] Met: [] Not Met: [] Adjusted  2. Disability index score of 9% or less for the [facility] to assist with reaching prior level of function. [] Progressing: [] Met: [] Not Met: [] Adjusted  3. Patient will demonstrate increased ankle AROM to pain free 5 degrees of dorsiflexion to allow for proper joint functioning as indicated by patients Functional Deficits. [] Progressing: [] Met: [] Not Met: [] Adjusted  4. Patient will demonstrate an increase in ankle strength to 5/5 and pain free in LE to allow for proper functional mobility as indicated by patients Functional Deficits. [] Progressing: [] Met: [] Not Met: [] Adjusted  5. Patient will be able to ambulate 30 minutes without increased symptoms or restriction. [] Progressing: [] Met: [] Not Met: [] Adjusted  6. Patient will be able to push/pull 50# without increased symptoms or restriction in order to return to using carts at work. [] Progressing: [] Met: [] Not Met: [] Adjusted                        Overall Progression Towards Functional goals/ Treatment Progress Update:  [] Patient is progressing as expected towards functional goals listed. [] Progression is slowed due to complexities/Impairments listed. [] Progression has been slowed due to co-morbidities.   [x] Plan just implemented, too soon to assess goals progression <30days   [] Goals require adjustment due to lack of progress  [] Patient is not progressing as expected and requires additional follow up with physician  [] Other    Prognosis for POC: [x] Good [] Fair  [] Poor      Patient requires continued skilled intervention: [x] Yes  [] No    Treatment/Activity Tolerance:  [x] Patient able to complete treatment  [] Patient limited by fatigue  [] Patient limited by pain     [] Patient limited by other

## 2023-11-13 ENCOUNTER — HOSPITAL ENCOUNTER (OUTPATIENT)
Dept: PHYSICAL THERAPY | Age: 63
Setting detail: THERAPIES SERIES
Discharge: HOME | End: 2023-11-13
Payer: COMMERCIAL

## 2023-11-13 PROCEDURE — 97112 NEUROMUSCULAR REEDUCATION: CPT

## 2023-11-13 PROCEDURE — 97530 THERAPEUTIC ACTIVITIES: CPT

## 2023-11-13 PROCEDURE — 97110 THERAPEUTIC EXERCISES: CPT

## 2023-11-13 NOTE — FLOWSHEET NOTE
Eversion Black 3x10    Heel walk     Toe walk     SLR     Calf Raises 3x10     Heel raises  3x10    Step Up    Lateral heel taps     Squats    Hamstring Curls     Leg Press          Scapular retraction     Theraband scapular retraction     Bilateral external rotation     Lateral band walks Green at ankles   10 laps at tables          Balance:     Rocker Board      BOSU     SLS 5x20\"   On airex     Aeromat     Foam Roll     Plyoback     Tandem Stance Biodex          Bike 6' L3    Treadmill          Manual interventions             Therapeutic Exercise and NMR EXR  [x] (98812) Provided verbal/tactile cueing for activities related to strengthening, flexibility, endurance, ROM for improvements in LE, proximal hip, and core control with self care, mobility, lifting, ambulation. [x] (10538) Provided verbal/tactile cueing for activities related to improving balance, coordination, kinesthetic sense, posture, motor skill, proprioception  to assist with LE, proximal hip, and core control in self care, mobility, lifting, ambulation and eccentric single leg control.      NMR and Therapeutic Activities:    [x] (53598 or 45207) Provided verbal/tactile cueing for activities related to improving balance, coordination, kinesthetic sense, posture, motor skill, proprioception and motor activation to allow for proper function of core, proximal hip and LE with self care and ADLs  [] (29842) Gait Re-education- Provided training and instruction to the patient for proper LE, core and proximal hip recruitment and positioning and eccentric body weight control with ambulation re-education including up and down stairs     Home Exercise Program:    [x] (38712) Reviewed/Progressed HEP activities related to strengthening, flexibility, endurance, ROM of core, proximal hip and LE for functional self-care, mobility, lifting and ambulation/stair navigation   [] (06176)Reviewed/Progressed HEP activities related to improving balance, coordination,

## 2023-11-15 ENCOUNTER — APPOINTMENT (OUTPATIENT)
Dept: GENERAL RADIOLOGY | Age: 63
End: 2023-11-15
Payer: COMMERCIAL

## 2023-11-15 ENCOUNTER — APPOINTMENT (OUTPATIENT)
Dept: PHYSICAL THERAPY | Age: 63
End: 2023-11-15
Payer: COMMERCIAL

## 2023-11-15 ENCOUNTER — HOSPITAL ENCOUNTER (EMERGENCY)
Age: 63
Discharge: HOME | End: 2023-11-15
Payer: COMMERCIAL

## 2023-11-15 ENCOUNTER — HOSPITAL ENCOUNTER (OUTPATIENT)
Dept: PHYSICAL THERAPY | Age: 63
Setting detail: THERAPIES SERIES
Discharge: HOME | End: 2023-11-15
Payer: COMMERCIAL

## 2023-11-15 VITALS
HEART RATE: 99 BPM | SYSTOLIC BLOOD PRESSURE: 124 MMHG | DIASTOLIC BLOOD PRESSURE: 60 MMHG | OXYGEN SATURATION: 98 % | HEIGHT: 64 IN | RESPIRATION RATE: 16 BRPM | WEIGHT: 135.14 LBS | BODY MASS INDEX: 23.07 KG/M2 | TEMPERATURE: 97.4 F

## 2023-11-15 DIAGNOSIS — S76.011A HIP STRAIN, RIGHT, INITIAL ENCOUNTER: ICD-10-CM

## 2023-11-15 DIAGNOSIS — S39.012A STRAIN OF LUMBAR REGION, INITIAL ENCOUNTER: Primary | ICD-10-CM

## 2023-11-15 DIAGNOSIS — S46.811A TRAPEZIUS STRAIN, RIGHT, INITIAL ENCOUNTER: ICD-10-CM

## 2023-11-15 PROCEDURE — 73502 X-RAY EXAM HIP UNI 2-3 VIEWS: CPT

## 2023-11-15 PROCEDURE — 6370000000 HC RX 637 (ALT 250 FOR IP): Performed by: PHYSICIAN ASSISTANT

## 2023-11-15 PROCEDURE — 99283 EMERGENCY DEPT VISIT LOW MDM: CPT

## 2023-11-15 PROCEDURE — 97110 THERAPEUTIC EXERCISES: CPT

## 2023-11-15 PROCEDURE — 97530 THERAPEUTIC ACTIVITIES: CPT

## 2023-11-15 RX ORDER — ACETAMINOPHEN 325 MG/1
650 TABLET ORAL ONCE
Status: COMPLETED | OUTPATIENT
Start: 2023-11-15 | End: 2023-11-15

## 2023-11-15 RX ORDER — CYCLOBENZAPRINE HCL 5 MG
5 TABLET ORAL 3 TIMES DAILY PRN
Qty: 15 TABLET | Refills: 0 | Status: SHIPPED | OUTPATIENT
Start: 2023-11-15 | End: 2023-11-25

## 2023-11-15 RX ORDER — CYCLOBENZAPRINE HCL 5 MG
5 TABLET ORAL 3 TIMES DAILY PRN
Qty: 15 TABLET | Refills: 0 | Status: SHIPPED | OUTPATIENT
Start: 2023-11-15 | End: 2023-11-15 | Stop reason: SDUPTHER

## 2023-11-15 RX ADMIN — ACETAMINOPHEN 650 MG: 325 TABLET ORAL at 21:12

## 2023-11-15 ASSESSMENT — PAIN DESCRIPTION - ORIENTATION: ORIENTATION: UPPER

## 2023-11-15 ASSESSMENT — PAIN SCALES - GENERAL
PAINLEVEL_OUTOF10: 6
PAINLEVEL_OUTOF10: 6

## 2023-11-15 ASSESSMENT — PAIN DESCRIPTION - DIRECTION: RADIATING_TOWARDS: RIGHT HIP

## 2023-11-15 ASSESSMENT — PAIN DESCRIPTION - DESCRIPTORS: DESCRIPTORS: DISCOMFORT

## 2023-11-15 ASSESSMENT — PAIN - FUNCTIONAL ASSESSMENT: PAIN_FUNCTIONAL_ASSESSMENT: ACTIVITIES ARE NOT PREVENTED

## 2023-11-15 ASSESSMENT — PAIN DESCRIPTION - FREQUENCY: FREQUENCY: CONTINUOUS

## 2023-11-15 ASSESSMENT — PAIN DESCRIPTION - ONSET: ONSET: SUDDEN

## 2023-11-15 ASSESSMENT — PAIN DESCRIPTION - LOCATION: LOCATION: BACK

## 2023-11-15 ASSESSMENT — PAIN DESCRIPTION - PAIN TYPE: TYPE: ACUTE PAIN

## 2023-11-16 ENCOUNTER — HOSPITAL ENCOUNTER (EMERGENCY)
Age: 63
Discharge: HOME | End: 2023-11-16
Attending: EMERGENCY MEDICINE
Payer: COMMERCIAL

## 2023-11-16 VITALS
HEART RATE: 97 BPM | TEMPERATURE: 99.1 F | SYSTOLIC BLOOD PRESSURE: 155 MMHG | WEIGHT: 130.07 LBS | BODY MASS INDEX: 22.21 KG/M2 | OXYGEN SATURATION: 96 % | DIASTOLIC BLOOD PRESSURE: 69 MMHG | RESPIRATION RATE: 17 BRPM | HEIGHT: 64 IN

## 2023-11-16 DIAGNOSIS — S29.012A STRAIN OF THORACIC BACK REGION: ICD-10-CM

## 2023-11-16 DIAGNOSIS — S39.012A STRAIN OF LUMBAR REGION, INITIAL ENCOUNTER: ICD-10-CM

## 2023-11-16 DIAGNOSIS — S46.912A STRAIN OF LEFT SHOULDER, INITIAL ENCOUNTER: ICD-10-CM

## 2023-11-16 DIAGNOSIS — T14.8XXA MUSCULOSKELETAL STRAIN: Primary | ICD-10-CM

## 2023-11-16 PROCEDURE — 99283 EMERGENCY DEPT VISIT LOW MDM: CPT

## 2023-11-16 PROCEDURE — 6370000000 HC RX 637 (ALT 250 FOR IP): Performed by: EMERGENCY MEDICINE

## 2023-11-16 RX ORDER — LIDOCAINE 4 G/G
2 PATCH TOPICAL ONCE
Status: DISCONTINUED | OUTPATIENT
Start: 2023-11-16 | End: 2023-11-16 | Stop reason: HOSPADM

## 2023-11-16 RX ORDER — MELOXICAM 7.5 MG/1
7.5-15 TABLET ORAL DAILY
Qty: 28 TABLET | Refills: 0 | Status: SHIPPED | OUTPATIENT
Start: 2023-11-16 | End: 2023-11-30

## 2023-11-16 RX ORDER — LIDOCAINE 50 MG/G
1 PATCH TOPICAL DAILY
Qty: 10 PATCH | Refills: 0 | Status: SHIPPED | OUTPATIENT
Start: 2023-11-16 | End: 2023-11-26

## 2023-11-16 ASSESSMENT — PAIN - FUNCTIONAL ASSESSMENT
PAIN_FUNCTIONAL_ASSESSMENT: NONE - DENIES PAIN
PAIN_FUNCTIONAL_ASSESSMENT: 0-10
PAIN_FUNCTIONAL_ASSESSMENT: PREVENTS OR INTERFERES SOME ACTIVE ACTIVITIES AND ADLS

## 2023-11-16 ASSESSMENT — PAIN DESCRIPTION - LOCATION: LOCATION: SHOULDER

## 2023-11-16 ASSESSMENT — PAIN DESCRIPTION - ORIENTATION: ORIENTATION: LEFT

## 2023-11-16 ASSESSMENT — LIFESTYLE VARIABLES
HOW OFTEN DO YOU HAVE A DRINK CONTAINING ALCOHOL: MONTHLY OR LESS
HOW MANY STANDARD DRINKS CONTAINING ALCOHOL DO YOU HAVE ON A TYPICAL DAY: PATIENT DOES NOT DRINK

## 2023-11-16 ASSESSMENT — PAIN SCALES - GENERAL: PAINLEVEL_OUTOF10: 6

## 2023-11-16 ASSESSMENT — PAIN DESCRIPTION - DESCRIPTORS: DESCRIPTORS: SHARP

## 2023-11-16 NOTE — DISCHARGE INSTRUCTIONS
Recommend ice for inflammation. If he feels that the muscles are spasming you can apply heat. Do recommend gentle stretching and is a slightly mobile. If you begin having new leg weakness/numbness/tingling, incontinence of urine or bowel, numbness and tingling to inner thighs or genital region seek reevaluation in the ED. If you have any chest pain, shortness breath, abdominal pain, seek reevaluation in the ED. Follow-up with [facility] [facility] for workers compensation case. See information above. Take Tylenol for pain as needed. If you feel as if your back is spasming you can try a muscle relaxer, Flexeril 5 mg. Do not drive, operate vehicle or drink alcohol taking as it can be sedating.

## 2023-11-16 NOTE — ED TRIAGE NOTES
Patient to the ER with complaints of upper right sided back pain and right sided hip pain that started when she turned to throw a heavy bag of trash in a trash can at work today. Today was the patient's first time back after being out on a previous work injury to her upper back.

## 2023-11-16 NOTE — ED PROVIDER NOTES
START taking these medications    Details   cyclobenzaprine (FLEXERIL) 5 MG tablet Take 1 tablet by mouth 3 times daily as needed for Muscle spasms, Disp-15 tablet, R-0Normal             DISCONTINUED MEDICATIONS:  Discharge Medication List as of 11/15/2023 10:21 PM        STOP taking these medications       meloxicam (MOBIC) 7.5 MG tablet Comments:   Reason for Stopping:                      (Please note that portions of this note were completed with a voice recognition program.  Efforts were made to edit the dictations but occasionally words are mis-transcribed.)    Carolyn Fontanez PA-C (electronically signed)          Carolyn Fontanez PA-C  11/16/23 9876

## 2023-11-16 NOTE — ED PROVIDER NOTES
No   Exclusion criteria - the patient is NOT to be included for SEP-1 Core Measure due to: Infection is not suspected  CC/HPI Summary, DDx, ED Course, and Reassessment:   Saumya Garcia is a 66 y.o. female who presents to the emergency department secondary to concern for symptoms as noted in HPI above. On presentation she is awake, alert, oriented. Her vitals are notable for slightly elevated heart rate and blood pressure on arrival.  At the time of my exam her heart rate has improved to the 80s. She does appear anxious and she tells me she is just worried that she will get the documentation she needs to make sure that she is getting appropriate treatment through [facility]. During the exam she shows full range of motion of her shoulders. She did ask about getting imaging done however we discussed that with no new trauma or injuries the x-ray will show fractures or dislocations neither of which I believe are the cause of her discomfort today. We did discuss when somebody has an injury it is normal to feel worse the following day and for this to go on for a few days before symptoms start improving. Given her history and exam I have low concern for an infectious process, ACS, fracture or dislocation or other acute bony pathology. She adamantly tells me that she does not want anti-inflammatories, steroids, or medicines in general.  I did ask her what it is that I could provide for her today if she did not want medications for treatment. She again repeated her main concern was regarding documentation for work given the injury happened at work. After further discussion regarding the anti-inflammatories (she does not recall which one her primary care previously had her try) she was amenable to getting the meloxicam prescribed to her to try if she was not feeling better. I did let her know anti-inflammatories did much more than just \"mask the pain\" as she has stated.   I did recommend that she take

## 2023-11-16 NOTE — DISCHARGE INSTRUCTIONS
We saw you in the emergency department for pain related to musculoskeletal strain. We discussed it is normal to feel worse the day after an injury. Usually this persists for about 2 to 5 days and symptoms start to improve around 1 week. We recommend that you:   - Use the stretching and strengthening exercises at least twice per day, continue to complete physical activity such as walking, and use heat or ice to the area of pain as it helps you.   -You need to follow-up with your Worker's Comp. to have more physical therapy. - Take Tylenol as needed for your pain. We have also prescribed:       - meloxicam (NSAID) you take it once a day up to 15mg, do not take any other NSAIDs while you are taking this. Anti-inflammatories are very important when it comes to musculoskeletal strain because they help decrease the inflammation which helps you heal faster.      -The muscle relaxer which was prescribed to yesterday Flexeril, best when used when trying to sleep robaxin (muscle relaxer) best for use when trying to sleep      - lidocaine patches, 5%, the 4% are available over the counter as well   - Follow up with your primary care as needed    Return to ED for any new or worsening symptoms or concerns.

## 2023-11-17 ENCOUNTER — HOSPITAL ENCOUNTER (EMERGENCY)
Age: 63
Discharge: HOME | End: 2023-11-17
Attending: EMERGENCY MEDICINE
Payer: COMMERCIAL

## 2023-11-17 VITALS
OXYGEN SATURATION: 98 % | WEIGHT: 125.44 LBS | HEIGHT: 64 IN | SYSTOLIC BLOOD PRESSURE: 126 MMHG | DIASTOLIC BLOOD PRESSURE: 66 MMHG | HEART RATE: 78 BPM | BODY MASS INDEX: 21.42 KG/M2 | TEMPERATURE: 98.6 F | RESPIRATION RATE: 16 BRPM

## 2023-11-17 DIAGNOSIS — M79.652 LEFT THIGH PAIN: Primary | ICD-10-CM

## 2023-11-17 PROCEDURE — 99283 EMERGENCY DEPT VISIT LOW MDM: CPT

## 2023-11-17 RX ORDER — ACETAMINOPHEN 500 MG
1000 TABLET ORAL 3 TIMES DAILY
Qty: 42 TABLET | Refills: 0 | Status: SHIPPED | OUTPATIENT
Start: 2023-11-17 | End: 2023-11-24

## 2023-11-17 ASSESSMENT — PAIN - FUNCTIONAL ASSESSMENT
PAIN_FUNCTIONAL_ASSESSMENT: 0-10
PAIN_FUNCTIONAL_ASSESSMENT: 0-10

## 2023-11-17 ASSESSMENT — PAIN SCALES - GENERAL
PAINLEVEL_OUTOF10: 4
PAINLEVEL_OUTOF10: 3

## 2023-11-17 NOTE — ED TRIAGE NOTES
Ziggy Wilkes is a 66 y.o. female that brought herself to the ER for eval of left groin pain and left knee pain that started last night and that she feels is related to her injury she had wed when she was trying to put a really heavy bag of trash in the dumpster at work. The patient is alert and oriented with an open and patent airway. The patient states that her pain is a 4/10.

## 2023-11-18 NOTE — ED NOTES
.Pt discharged at this time. Discharge instructions and medications reviewed,  Questions were answered. PT verbalized understanding. VSS, Afebrile. Follow up appointments were discussed.         Celestino Machado, [address]  11/17/23 6761

## 2023-11-18 NOTE — DISCHARGE INSTRUCTIONS
Your prescription has been sent to [facility]. The medication that Dr. Scott Castillo prescribed for you (Meloxiacm, aka Mobic) yesterday would be very appropriate for treatment of the pain that you are currently experiencing. Avoid over-the-counter NSAID medications (ex: Ibuprofen, Advil, Motrin, Naproxen, Aleve, Aspirin, etc.) while you are taking that medicine. Topical lidocaine patches would also be a great option for you. Applying a gentle compression wrap over your thigh may also be helpful for you. Be sure to keep your appointment in clinic on the 22nd of this month. If you have any new or worsening issues after going home don't hesitate to return here for reevaluation at any time 24/7!

## 2023-11-27 ASSESSMENT — ENCOUNTER SYMPTOMS
ABDOMINAL PAIN: 0
VOMITING: 0
NAUSEA: 0
SHORTNESS OF BREATH: 0

## 2023-12-14 ENCOUNTER — OFFICE VISIT (OUTPATIENT)
Dept: ORTHOPEDIC SURGERY | Age: 63
End: 2023-12-14

## 2023-12-14 VITALS — BODY MASS INDEX: 22.2 KG/M2 | HEIGHT: 64 IN | WEIGHT: 130 LBS

## 2023-12-14 DIAGNOSIS — M25.511 CHRONIC RIGHT SHOULDER PAIN: ICD-10-CM

## 2023-12-14 DIAGNOSIS — M75.81 ROTATOR CUFF TENDINITIS, RIGHT: Primary | ICD-10-CM

## 2023-12-14 DIAGNOSIS — M19.011 ARTHRITIS OF RIGHT ACROMIOCLAVICULAR JOINT: ICD-10-CM

## 2023-12-14 DIAGNOSIS — G89.29 CHRONIC RIGHT SHOULDER PAIN: ICD-10-CM

## 2023-12-14 RX ORDER — ESCITALOPRAM OXALATE 10 MG/1
10 TABLET ORAL DAILY
COMMUNITY
Start: 2023-12-07

## 2023-12-14 RX ORDER — ALPRAZOLAM 0.5 MG/1
0.5 TABLET ORAL NIGHTLY PRN
COMMUNITY
Start: 2023-12-07 | End: 2024-01-06

## 2023-12-14 RX ORDER — CYCLOBENZAPRINE HCL 10 MG
10 TABLET ORAL NIGHTLY
Qty: 30 TABLET | Refills: 0 | Status: SHIPPED | OUTPATIENT
Start: 2023-12-14 | End: 2024-01-13

## 2023-12-14 RX ORDER — BUPIVACAINE HYDROCHLORIDE 2.5 MG/ML
3 INJECTION, SOLUTION INFILTRATION; PERINEURAL ONCE
Status: COMPLETED | OUTPATIENT
Start: 2023-12-14 | End: 2023-12-14

## 2023-12-14 RX ORDER — TRIAMCINOLONE ACETONIDE 40 MG/ML
80 INJECTION, SUSPENSION INTRA-ARTICULAR; INTRAMUSCULAR ONCE
Status: COMPLETED | OUTPATIENT
Start: 2023-12-14 | End: 2023-12-14

## 2023-12-14 RX ORDER — IBUPROFEN 600 MG/1
600 TABLET ORAL 2 TIMES DAILY WITH MEALS
Qty: 60 TABLET | Refills: 0 | Status: SHIPPED | OUTPATIENT
Start: 2023-12-14

## 2023-12-14 RX ADMIN — TRIAMCINOLONE ACETONIDE 80 MG: 40 INJECTION, SUSPENSION INTRA-ARTICULAR; INTRAMUSCULAR at 10:08

## 2023-12-14 RX ADMIN — BUPIVACAINE HYDROCHLORIDE 7.5 MG: 2.5 INJECTION, SOLUTION INFILTRATION; PERINEURAL at 10:08

## 2023-12-15 ENCOUNTER — TELEPHONE (OUTPATIENT)
Dept: ORTHOPEDIC SURGERY | Age: 63
End: 2023-12-15

## 2023-12-15 NOTE — TELEPHONE ENCOUNTER
General Question     Subject: PLEASE CALL TO GO OVER APPT  Patient and /or Facility Request: Jemal Adames  Contact Number: [phone]     Pt STATES SHE WOULD LIKE TO SPEAK TO SOMEONE ABOUT HER APPT. SHE WAS TOLD WHAT IS WRONG, BUT WOULD LIKE TO GO OVER THAT, AGAIN, TO MAKE SURE SHE UNDERSTANDS EVERYTHING CORRECTLY. Pt HAS APPT THIS AM, BUT IF YOU CALL IN THE PM, SHE SHOULD BE ABLE TO TALK.

## 2024-08-06 ENCOUNTER — APPOINTMENT (OUTPATIENT)
Dept: CT IMAGING | Facility: HOSPITAL | Age: 64
DRG: 552 | End: 2024-08-06
Payer: COMMERCIAL

## 2024-08-06 ENCOUNTER — APPOINTMENT (OUTPATIENT)
Dept: RADIOLOGY | Facility: HOSPITAL | Age: 64
DRG: 552 | End: 2024-08-06
Payer: COMMERCIAL

## 2024-08-06 ENCOUNTER — HOSPITAL ENCOUNTER (INPATIENT)
Facility: HOSPITAL | Age: 64
LOS: 1 days | Discharge: HOME | DRG: 552 | End: 2024-08-07
Attending: EMERGENCY MEDICINE | Admitting: SURGERY
Payer: COMMERCIAL

## 2024-08-06 DIAGNOSIS — S09.90XA CLOSED HEAD INJURY, INITIAL ENCOUNTER: ICD-10-CM

## 2024-08-06 DIAGNOSIS — S22.080A COMPRESSION FRACTURE OF T11 VERTEBRA, INITIAL ENCOUNTER (CMS/HCC): Primary | ICD-10-CM
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
## 2024-08-06 DIAGNOSIS — S32.009A CLOSED FRACTURE OF SPINOUS PROCESS OF LUMBAR VERTEBRA, INITIAL ENCOUNTER (CMS/HCC): ICD-10-CM

## 2024-08-06 PROBLEM — S22.089A: Status: ACTIVE | Noted: 2024-08-06

## 2024-08-06 PROCEDURE — 2580000300 HC RX 258: Performed by: EMERGENCY MEDICINE

## 2024-08-06 PROCEDURE — 73552 X-RAY EXAM OF FEMUR 2/>: CPT | Mod: RT

## 2024-08-06 PROCEDURE — 73552 X-RAY EXAM OF FEMUR 2/>: CPT | Mod: LT

## 2024-08-06 PROCEDURE — 70450 CT HEAD/BRAIN W/O DYE: CPT

## 2024-08-06 PROCEDURE — 99283 EMERGENCY DEPT VISIT LOW MDM: CPT | Performed by: NEUROLOGICAL SURGERY

## 2024-08-06 PROCEDURE — 72125 CT NECK SPINE W/O DYE: CPT

## 2024-08-06 PROCEDURE — 6360000200 HC RX 636 W HCPCS (ALT 250 FOR IP): Performed by: EMERGENCY MEDICINE

## 2024-08-06 PROCEDURE — 36415 COLL VENOUS BLD VENIPUNCTURE: CPT | Performed by: EMERGENCY MEDICINE

## 2024-08-06 PROCEDURE — 72131 CT LUMBAR SPINE W/O DYE: CPT

## 2024-08-06 PROCEDURE — 99285 EMERGENCY DEPT VISIT HI MDM: CPT | Performed by: EMERGENCY MEDICINE

## 2024-08-06 PROCEDURE — 71045 X-RAY EXAM CHEST 1 VIEW: CPT

## 2024-08-06 PROCEDURE — 72170 X-RAY EXAM OF PELVIS: CPT

## 2024-08-06 RX ORDER — KETOROLAC TROMETHAMINE 15 MG/ML
15 INJECTION, SOLUTION INTRAMUSCULAR; INTRAVENOUS ONCE
Status: COMPLETED | OUTPATIENT
Start: 2024-08-06 | End: 2024-08-06

## 2024-08-06 RX ORDER — SODIUM CHLORIDE 9 MG/ML
25-50 INJECTION, SOLUTION INTRAVENOUS AS NEEDED
Status: DISCONTINUED | OUTPATIENT
Start: 2024-08-06 | End: 2024-08-07 | Stop reason: SDUPTHER

## 2024-08-06 RX ORDER — SODIUM CHLORIDE 0.9 % (FLUSH) 0.9 %
3 SYRINGE (ML) INJECTION AS NEEDED
Status: DISCONTINUED | OUTPATIENT
Start: 2024-08-06 | End: 2024-08-07 | Stop reason: SDUPTHER

## 2024-08-06 RX ORDER — SODIUM CHLORIDE 9 MG/ML
1000 INJECTION, SOLUTION INTRAVENOUS ONCE
Status: COMPLETED | OUTPATIENT
Start: 2024-08-06 | End: 2024-08-07

## 2024-08-06 RX ADMIN — SODIUM CHLORIDE 1000 ML: 9 INJECTION, SOLUTION INTRAVENOUS at 22:26

## 2024-08-06 RX ADMIN — KETOROLAC TROMETHAMINE 15 MG: 15 INJECTION, SOLUTION INTRAMUSCULAR; INTRAVENOUS at 22:23
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
## 2024-08-07 ENCOUNTER — APPOINTMENT (OUTPATIENT)
Dept: RADIOLOGY | Facility: HOSPITAL | Age: 64
DRG: 552 | End: 2024-08-07
Payer: COMMERCIAL

## 2024-08-07 ENCOUNTER — APPOINTMENT (OUTPATIENT)
Dept: CT IMAGING | Facility: HOSPITAL | Age: 64
DRG: 552 | End: 2024-08-07
Payer: COMMERCIAL

## 2024-08-07 ENCOUNTER — APPOINTMENT (OUTPATIENT)
Dept: MRI IMAGING | Facility: HOSPITAL | Age: 64
DRG: 552 | End: 2024-08-07
Payer: COMMERCIAL

## 2024-08-07 VITALS
HEART RATE: 76 BPM | OXYGEN SATURATION: 94 % | RESPIRATION RATE: 16 BRPM | SYSTOLIC BLOOD PRESSURE: 113 MMHG | HEIGHT: 63 IN | DIASTOLIC BLOOD PRESSURE: 64 MMHG | BODY MASS INDEX: 22.54 KG/M2 | TEMPERATURE: 97.4 F | WEIGHT: 127.21 LBS
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
## 2024-08-07 LAB
ALBUMIN SERPL-MCNC: 4.2 G/DL (ref 3.5–5.3)
ALP SERPL-CCNC: 77 U/L (ref 50–130)
ALT SERPL-CCNC: 23 U/L (ref 7–52)
ANION GAP SERPL CALC-SCNC: 8 MMOL/L (ref 3–11)
APTT PPP: 26.6 SECONDS (ref 25.1–36.5)
AST SERPL-CCNC: 34 U/L
BACTERIA #/AREA URNS HPF: ABNORMAL /HPF
BILIRUB SERPL-MCNC: 0.4 MG/DL (ref 0.2–1.4)
BILIRUB UR QL: NEGATIVE
BUN SERPL-MCNC: 14 MG/DL (ref 7–25)
CALCIUM ALBUM COR SERPL-MCNC: 8.7 MG/DL (ref 8.6–10.3)
CALCIUM SERPL-MCNC: 8.9 MG/DL (ref 8.6–10.3)
CHLORIDE SERPL-SCNC: 108 MMOL/L (ref 98–107)
CLARITY UR: CLEAR
CO2 SERPL-SCNC: 24 MMOL/L (ref 21–32)
COLOR UR: COLORLESS
CREAT SERPL-MCNC: 0.71 MG/DL (ref 0.6–1.1)
EGFRCR SERPLBLD CKD-EPI 2021: 96 ML/MIN/1.73M*2
ERYTHROCYTE [DISTWIDTH] IN BLOOD BY AUTOMATED COUNT: 13.3 % (ref 11.5–14)
ETHANOL SERPL-MCNC: <10 MG/DL (ref 0–10)
GLUCOSE SERPL-MCNC: 140 MG/DL (ref 70–105)
GLUCOSE UR QL: NEGATIVE MG/DL
HCT VFR BLD AUTO: 40 % (ref 34–45)
HGB BLD-MCNC: 13.2 G/DL (ref 11.5–15.5)
HGB UR QL: ABNORMAL
INR BLD: 1
KETONES UR-MCNC: NEGATIVE MG/DL
LEUKOCYTE ESTERASE UR QL STRIP: ABNORMAL
MCH RBC QN AUTO: 30.2 PG (ref 28–33)
MCHC RBC AUTO-ENTMCNC: 32.9 G/DL (ref 32–36)
MCV RBC AUTO: 91.7 FL (ref 81–97)
NITRITE UR QL: NEGATIVE
PH UR: 5.5 PH
PLATELET # BLD AUTO: 169 10*3/UL (ref 140–350)
PMV BLD AUTO: 8.8 FL (ref 6.9–10.8)
POTASSIUM SERPL-SCNC: 3.4 MMOL/L (ref 3.5–5.1)
PROT SERPL-MCNC: 6.4 G/DL (ref 6–8.3)
PROT UR STRIP-MCNC: NEGATIVE MG/DL
PROTHROMBIN TIME: 11.5 SECONDS (ref 9.4–12.5)
RBC # BLD AUTO: 4.37 10*6/ΜL (ref 3.7–5.3)
RBC #/AREA URNS HPF: ABNORMAL /HPF
SODIUM SERPL-SCNC: 140 MMOL/L (ref 135–145)
SP GR UR: 1.03 (ref 1–1.03)
SQUAMOUS #/AREA URNS HPF: ABNORMAL /HPF
UROBILINOGEN UR-MCNC: NORMAL MG/DL
WBC # BLD AUTO: 12.7 10*3/UL (ref 4.5–10.5)
WBC #/AREA URNS HPF: ABNORMAL /HPF
WBC CLUMPS #/AREA URNS HPF: ABNORMAL /HPF

## 2024-08-07 PROCEDURE — 80053 COMPREHEN METABOLIC PANEL: CPT | Performed by: EMERGENCY MEDICINE

## 2024-08-07 PROCEDURE — 99235 HOSP IP/OBS SAME DATE MOD 70: CPT

## 2024-08-07 PROCEDURE — 6370000100 HC RX 637 (ALT 250 FOR IP)

## 2024-08-07 PROCEDURE — 6360000200 HC RX 636 W HCPCS (ALT 250 FOR IP)

## 2024-08-07 PROCEDURE — 74177 CT ABD & PELVIS W/CONTRAST: CPT

## 2024-08-07 PROCEDURE — 72146 MRI CHEST SPINE W/O DYE: CPT

## 2024-08-07 PROCEDURE — 90471 IMMUNIZATION ADMIN: CPT | Performed by: EMERGENCY MEDICINE

## 2024-08-07 PROCEDURE — 6360000200 HC RX 636 W HCPCS (ALT 250 FOR IP): Performed by: EMERGENCY MEDICINE

## 2024-08-07 PROCEDURE — 2550000100 HC RX 255: Performed by: EMERGENCY MEDICINE

## 2024-08-07 PROCEDURE — 85027 COMPLETE CBC AUTOMATED: CPT | Performed by: EMERGENCY MEDICINE

## 2024-08-07 PROCEDURE — 82077 ASSAY SPEC XCP UR&BREATH IA: CPT | Performed by: EMERGENCY MEDICINE

## 2024-08-07 PROCEDURE — 72070 X-RAY EXAM THORAC SPINE 2VWS: CPT

## 2024-08-07 PROCEDURE — 99231 SBSQ HOSP IP/OBS SF/LOW 25: CPT | Performed by: PHYSICIAN ASSISTANT

## 2024-08-07 PROCEDURE — (BLANK) HC ROOM PRIVATE

## 2024-08-07 PROCEDURE — 36415 COLL VENOUS BLD VENIPUNCTURE: CPT | Performed by: EMERGENCY MEDICINE

## 2024-08-07 PROCEDURE — 96361 HYDRATE IV INFUSION ADD-ON: CPT

## 2024-08-07 PROCEDURE — 85610 PROTHROMBIN TIME: CPT | Performed by: EMERGENCY MEDICINE

## 2024-08-07 PROCEDURE — 90715 TDAP VACCINE 7 YRS/> IM: CPT | Performed by: EMERGENCY MEDICINE

## 2024-08-07 PROCEDURE — 81001 URINALYSIS AUTO W/SCOPE: CPT | Performed by: EMERGENCY MEDICINE

## 2024-08-07 PROCEDURE — 85730 THROMBOPLASTIN TIME PARTIAL: CPT | Performed by: EMERGENCY MEDICINE

## 2024-08-07 PROCEDURE — 96374 THER/PROPH/DIAG INJ IV PUSH: CPT

## 2024-08-07 RX ORDER — OXYCODONE HYDROCHLORIDE 5 MG/1
5 TABLET ORAL EVERY 6 HOURS PRN
Qty: 10 TABLET | Refills: 0 | Status: SHIPPED | OUTPATIENT
Start: 2024-08-07

## 2024-08-07 RX ORDER — POTASSIUM CHLORIDE 7.45 MG/ML
10 INJECTION INTRAVENOUS ONCE
Status: COMPLETED | OUTPATIENT
Start: 2024-08-07 | End: 2024-08-07

## 2024-08-07 RX ORDER — MELOXICAM 7.5 MG/1
15 TABLET ORAL
Status: DISCONTINUED | OUTPATIENT
Start: 2024-08-07 | End: 2024-08-08 | Stop reason: HOSPADM

## 2024-08-07 RX ORDER — SODIUM CHLORIDE 0.9 % (FLUSH) 0.9 %
3 SYRINGE (ML) INJECTION AS NEEDED
Status: DISCONTINUED | OUTPATIENT
Start: 2024-08-07 | End: 2024-08-08 | Stop reason: HOSPADM

## 2024-08-07 RX ORDER — IOPAMIDOL 755 MG/ML
92 INJECTION, SOLUTION INTRAVASCULAR ONCE
Status: COMPLETED | OUTPATIENT
Start: 2024-08-07 | End: 2024-08-07

## 2024-08-07 RX ORDER — HYDROMORPHONE HYDROCHLORIDE 1 MG/ML
.2-.5 INJECTION, SOLUTION INTRAMUSCULAR; INTRAVENOUS; SUBCUTANEOUS EVERY 4 HOURS PRN
Status: DISCONTINUED | OUTPATIENT
Start: 2024-08-07 | End: 2024-08-08 | Stop reason: HOSPADM

## 2024-08-07 RX ORDER — LIDOCAINE 50 MG/G
1 PATCH TOPICAL DAILY
Qty: 10 PATCH | Refills: 0 | Status: SHIPPED | OUTPATIENT
Start: 2024-08-08 | End: 2024-08-18

## 2024-08-07 RX ORDER — MELOXICAM 15 MG/1
15 TABLET ORAL
Qty: 10 TABLET | Refills: 0 | Status: SHIPPED | OUTPATIENT
Start: 2024-08-08 | End: 2024-08-18

## 2024-08-07 RX ORDER — ENOXAPARIN SODIUM 100 MG/ML
40 INJECTION SUBCUTANEOUS
Status: DISCONTINUED | OUTPATIENT
Start: 2024-08-07 | End: 2024-08-08 | Stop reason: HOSPADM

## 2024-08-07 RX ORDER — OXYCODONE HYDROCHLORIDE 10 MG/1
10 TABLET ORAL EVERY 4 HOURS PRN
Status: DISCONTINUED | OUTPATIENT
Start: 2024-08-07 | End: 2024-08-08 | Stop reason: HOSPADM

## 2024-08-07 RX ORDER — LOPERAMIDE HCL 2 MG
2-4 CAPSULE ORAL 3 TIMES DAILY PRN
Status: DISCONTINUED | OUTPATIENT
Start: 2024-08-07 | End: 2024-08-08 | Stop reason: HOSPADM

## 2024-08-07 RX ORDER — GABAPENTIN 300 MG/1
300 CAPSULE ORAL 3 TIMES DAILY
Qty: 30 CAPSULE | Refills: 0 | Status: SHIPPED | OUTPATIENT
Start: 2024-08-07 | End: 2024-08-17

## 2024-08-07 RX ORDER — ESCITALOPRAM OXALATE 5 MG/1
5 TABLET ORAL DAILY
COMMUNITY

## 2024-08-07 RX ORDER — ONDANSETRON 4 MG/1
4 TABLET, FILM COATED ORAL EVERY 6 HOURS PRN
Status: DISCONTINUED | OUTPATIENT
Start: 2024-08-07 | End: 2024-08-08 | Stop reason: HOSPADM

## 2024-08-07 RX ORDER — GABAPENTIN 300 MG/1
300 CAPSULE ORAL 3 TIMES DAILY
Status: DISCONTINUED | OUTPATIENT
Start: 2024-08-07 | End: 2024-08-08 | Stop reason: HOSPADM

## 2024-08-07 RX ORDER — ACETAMINOPHEN 325 MG/1
650 TABLET ORAL EVERY 6 HOURS SCHEDULED
Status: DISCONTINUED | OUTPATIENT
Start: 2024-08-07 | End: 2024-08-08 | Stop reason: HOSPADM

## 2024-08-07 RX ORDER — ADHESIVE BANDAGE
30 BANDAGE TOPICAL DAILY PRN
Status: DISCONTINUED | OUTPATIENT
Start: 2024-08-07 | End: 2024-08-08 | Stop reason: HOSPADM

## 2024-08-07 RX ORDER — OXYCODONE HYDROCHLORIDE 5 MG/1
5 TABLET ORAL EVERY 4 HOURS PRN
Status: DISCONTINUED | OUTPATIENT
Start: 2024-08-07 | End: 2024-08-08 | Stop reason: HOSPADM

## 2024-08-07 RX ORDER — LIDOCAINE 50 MG/G
1 PATCH TOPICAL DAILY
Status: DISCONTINUED | OUTPATIENT
Start: 2024-08-07 | End: 2024-08-08 | Stop reason: HOSPADM

## 2024-08-07 RX ORDER — ONDANSETRON HYDROCHLORIDE 2 MG/ML
4 INJECTION, SOLUTION INTRAVENOUS EVERY 6 HOURS PRN
Status: DISCONTINUED | OUTPATIENT
Start: 2024-08-07 | End: 2024-08-08 | Stop reason: HOSPADM

## 2024-08-07 RX ORDER — AMOXICILLIN 250 MG
1 CAPSULE ORAL NIGHTLY
Status: DISCONTINUED | OUTPATIENT
Start: 2024-08-07 | End: 2024-08-08 | Stop reason: HOSPADM

## 2024-08-07 RX ADMIN — ACETAMINOPHEN 650 MG: 325 TABLET ORAL at 12:07

## 2024-08-07 RX ADMIN — ACETAMINOPHEN 650 MG: 325 TABLET ORAL at 05:40

## 2024-08-07 RX ADMIN — IOPAMIDOL 92 ML: 755 INJECTION, SOLUTION INTRAVENOUS at 00:40

## 2024-08-07 RX ADMIN — POTASSIUM CHLORIDE 10 MEQ: 7.46 INJECTION, SOLUTION INTRAVENOUS at 01:08

## 2024-08-07 RX ADMIN — ACETAMINOPHEN 650 MG: 325 TABLET ORAL at 01:14

## 2024-08-07 RX ADMIN — ENOXAPARIN SODIUM 40 MG: 100 INJECTION SUBCUTANEOUS at 13:52

## 2024-08-07 RX ADMIN — ACETAMINOPHEN 650 MG: 325 TABLET ORAL at 17:23

## 2024-08-07 RX ADMIN — TETANUS TOXOID, REDUCED DIPHTHERIA TOXOID AND ACELLULAR PERTUSSIS VACCINE, ADSORBED 0.5 ML: 5; 2.5; 8; 8; 2.5 SUSPENSION INTRAMUSCULAR at 21:49

## 2024-08-07 RX ADMIN — LIDOCAINE 1 PATCH: 50 PATCH TOPICAL at 09:17

## 2024-08-07 RX ADMIN — MELOXICAM 15 MG: 7.5 TABLET ORAL at 09:17

## 2024-08-07 ASSESSMENT — ACTIVITIES OF DAILY LIVING (ADL): ADEQUATE_TO_COMPLETE_ADL: YES

## 2024-08-07 ASSESSMENT — ENCOUNTER SYMPTOMS
BACK PAIN: 1
GASTROINTESTINAL NEGATIVE: 1
CONSTITUTIONAL NEGATIVE: 1
CARDIOVASCULAR NEGATIVE: 1
EYES NEGATIVE: 1
HEADACHES: 1
RESPIRATORY NEGATIVE: 1
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
## 2024-08-07 NOTE — DISCHARGE SUMMARY
Cross Cover Note    Patient sitting upright at edge of bed.  States she is doing well.  Complains of some back pain, muscle pain.  States she refuses to take narcotics, does not believe in them.  States she is going to go home today and she is not staying in the hospital.  Denies numbness and tingling to extremities.  States she is walking without difficulty.  Denies fever, chills, headache, chest pain, shortness of breath, abdominal pain, nausea, vomiting.    Physical Exam:  General:  alert, oriented, no acute distress  Head:   normocephalic  Eyes:   extra ocular movements intact  Mouth:   Oral mucosa moist  Neck:   No lymphadenopathy, No JVD  Lungs:  CTAB, good air movement  Heart:  regular rate and rhythm, normal S1, S2, no murmurs, gallops or rub appreciated.  Abdomen:  Soft, nontender, nondistended. BS present. No rebound tenderness or guarding. No peritonitis or masses noted. No tympany noted upon percussion.   Musculoskeletal:   no edema, CMS intact.      A/P: Hemodynamically stable.  Nontoxic-appearing.  Tertiary physical exam without further acute injuries or findings.  Patient seen and examined alongside neurosurgery this morning.  Will hold off on TLSO brace order and proceed w/ MRI of thoracic spine. Pt likely able to d/c home later today once MRI obtained and ok'd by [facility].    Amanda Collado, CNP
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
## 2024-08-07 NOTE — NURSING END OF SHIFT
Nursing End of Shift Summary:     Patient: Sarah Finn  MRN: [id number]  : 1960, Age: 64 y.o.    Location: [address] [place] [address]    Nursing Goals  Clinical Goals for the Shift: Pain management, ensure safety, promote rest and sleep, bedrest/logroll    Narrative Summary of Progress Toward Clinical Goals:  Complained of pain- offered Oxycodone but refused - she said Oxy is too strong, informed surgical on call; educated about the ordered bedrest, log rolling and the 30 degrees HOB limitation- verbalized understanding    For brace application    Barriers to Goals/Nursing Concerns:  Yes - brace    New Patient or Family Concerns/Issues:  Yes - mentioned about taking antianxiety drug every morning at 5am but forgot its name and she's concern about drug interaction if she's going to take other medications ( pain medication for instance)    Shift Summary:   Significant Events & Communications to Providers (Last 12 Hours)       Last 5 Values       Row Name 24 0242                   Provider Notification    Reason for Communication Other (Comment)  refused for Oxycodone and requested for something else- non-narcotic  -IA        Provider Name Messi Raymond  -IA                  User Key  (r) = Recorded By, (t) = Taken By, (c) = Cosigned By      Initials Name    Gladys Ornelas, KAVITA                  Oxygen Usage (Last 12 Hours)       Last 5 Values    No documentation.                 Mobility (Last 12 Hours)       Last 5 Values       Row Name 24 0223 24 0257 24 0417 24 0434          Mobility    Activity -- Bedrest Turn --     Patient Position In bed In bed -- In bed     Turning/Repositioning Supine Supine Supine Supine                   Urethral Catheter       Active Urethral Catheter       None                  Active Lines       Active Central venous catheter / Peripherally inserted central catheter / Implantable Port / Hemodialysis catheter / Midline Catheter       None                  Infusing  Medications   Medication Dose Last Rate     PRN Medications   Medication Dose Last Admin    sodium chloride  3 mL      sodium chloride  1 spray      sodium chloride-aloe vera  1 Application      ondansetron  4 mg      Or    ondansetron  4 mg      magnesium hydroxide  30 mL      oxyCODONE  5 mg      oxyCODONE  10 mg      HYDROmorphone  0.2-0.5 mg      sodium chloride 0.9% (NS)  25-50 mL      And    sodium chloride  3 mL      diphth,pertus(acell),tetanus  0.5 mL
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
## 2024-08-07 NOTE — DISCHARGE INSTRUCTIONS
Neurosurgery Discharge Instructions:  - TLSO at all times when sitting upright and out of bed.  Okay to place brace while seated.  Remove brace to shower.  You can anticipate wearing this brace for 12 weeks.  - Walk as much as tolerated. No lifting greater than 10 pounds until clear by neurosurgery.  No excessive twisting or bending.  - Follow-up in the neurosurgery clinic in approximately 4-6 weeks with repeat xrays.  - Feel free to call our neurosurgery clinic with any questions or concerns at [phone].

## 2024-08-07 NOTE — PLAN OF CARE
Problem: Knowledge Deficit  Goal: Patient/family/caregiver demonstrates understanding of disease process, treatment plan, medications, and discharge instructions  Description: INTERVENTIONS:   1. Complete learning assessment and assess knowledge base  2. Provide teaching at level of understanding   3. Provide teaching via preferred learning methods  8/7/2024 0427 by Gladys Dodge RN  Outcome: Progressing  8/7/2024 0426 by Gladys Dodge RN  Outcome: Progressing     Problem: Potential for Compromised Skin Integrity  Goal: Nutritional status is improving  Description: INTERVENTIONS:  1. Monitor and assess patient for malnutrition (ex- brittle hair, bruises, dry skin, pale skin and conjunctiva, muscle wasting, smooth red tongue, and disorientation)  2. Monitor patient's weight and dietary intake as ordered or per policy  3. Determine patient's food preferences and provide high-protein, high-caloric foods as appropriate  4. Assist patient with eating   5. Allow adequate time for meals   6. Encourage patient to take dietary supplement as ordered   7. Collaborate with dietitian  8. Include patient/family/caregiver in decisions related to nutrition  Outcome: Progressing     Problem: Pain - Adult  Goal: Verbalizes/displays adequate comfort level or baseline comfort level  Description: INTERVENTIONS:  1. Encourage patient to monitor pain and request interventions  2. Assess pain using the appropriate pain scale  3. Administer analgesics based on type and severity of pain and evaluate response  4. Educate/Implement non-pharmacological measures as appropriate and evaluate response  5. Consider cultural, developmental and social influences on pain and pain management  6. Notify Provider if interventions unsuccessful or patient reports new pain  Outcome: Progressing     Problem: Discharge Barriers  Goal: Patient's discharge needs are met  Description: INTERVENTIONS:  1. Assess patient for self-management skills  2.  Encourage participation in management  3. Identify potential discharge barriers on admission and throughout hospital stay  4. Involve patient/family/caregiver in discharge planning process  5. Collaborate with case management/ for discharge needs  Outcome: Progressing     Problem: Cardiovascular - Adult  Goal: Absence of cardiac dysrhythmias or at baseline  Description: INTERVENTIONS:  1. Continuous cardiac monitoring, monitor vital signs, obtain 12 lead EKG if indicated  2. Administer antiarrhythmic and heart rate control medications as ordered  3. Initiate emergency measures for life threatening arrhythmias  4. Monitor electrolytes and administer replacement therapy as ordered  Outcome: Progressing     Problem: Musculoskeletal - Adult  Goal: Maintain proper alignment of affected body part  Description: INTERVENTIONS:  1. Support and protect limb and body alignment per provider order  2. Instruct and reinforce with patient and family use of appropriate assistive device and precautions (e.g. spinal or hip dislocation precautions)  Outcome: Progressing

## 2024-08-07 NOTE — INTERDISCIPLINARY/THERAPY
Awaiting back brace, pending neurosurgery clearance. Patient currently on bedrest. PT to follow for initial evaluation as appropriate.

## 2024-08-07 NOTE — PLAN OF CARE
Problem: Knowledge Deficit  Goal: Patient/family/caregiver demonstrates understanding of disease process, treatment plan, medications, and discharge instructions  Description: INTERVENTIONS:   1. Complete learning assessment and assess knowledge base  2. Provide teaching at level of understanding   3. Provide teaching via preferred learning methods  Outcome: Progressing  Flowsheets (Taken 8/7/2024 1036)  Patient/family/caregiver demonstrates understanding of disease process, treatment plan, medications, and discharge instructions:   Complete learning assessment and assess knowledge base   Provide teaching at level of understanding   Provide teaching via preferred learning methods     Problem: Potential for Compromised Skin Integrity  Goal: Skin Integrity is Maintained or Improved  Description: INTERVENTIONS:  1. Assess and monitor skin integrity  2. Collaborate with interdisciplinary team and initiate plans and interventions as needed  3. Alternate a full bath with partial baths for elderly   4. Monitor patient's hygiene practices   5. Collaborate with wound, ostomy, and continence nurse  Outcome: Progressing  Flowsheets (Taken 8/7/2024 1036)  Skin integrity is maintained or improved: Assess and monitor skin integrity  Goal: Nutritional status is improving  Description: INTERVENTIONS:  1. Monitor and assess patient for malnutrition (ex- brittle hair, bruises, dry skin, pale skin and conjunctiva, muscle wasting, smooth red tongue, and disorientation)  2. Monitor patient's weight and dietary intake as ordered or per policy  3. Determine patient's food preferences and provide high-protein, high-caloric foods as appropriate  4. Assist patient with eating   5. Allow adequate time for meals   6. Encourage patient to take dietary supplement as ordered   7. Collaborate with dietitian  8. Include patient/family/caregiver in decisions related to nutrition  Outcome: Progressing  Flowsheets (Taken 8/7/2024 1036)  Nutritional  status is improving:   Monitor and assess patient for malnutrition (ex- brittle hair, bruises, dry skin, pale skin and conjunctiva, muscle wasting, smooth red tongue, and disorientation)   Allow adequate time for meals     Problem: Pain - Adult  Goal: Verbalizes/displays adequate comfort level or baseline comfort level  Description: INTERVENTIONS:  1. Encourage patient to monitor pain and request interventions  2. Assess pain using the appropriate pain scale  3. Administer analgesics based on type and severity of pain and evaluate response  4. Educate/Implement non-pharmacological measures as appropriate and evaluate response  5. Consider cultural, developmental and social influences on pain and pain management  6. Notify Provider if interventions unsuccessful or patient reports new pain  Outcome: Progressing  Flowsheets (Taken 8/7/2024 1036)  Verbalizes/displays adequate comfort level or baseline comfort level:   Encourage patient to monitor pain and request interventions   Assess pain using the appropriate pain scale   Administer analgesics based on type and severity of pain and evaluate response     Problem: Discharge Barriers  Goal: Patient's discharge needs are met  Description: INTERVENTIONS:  1. Assess patient for self-management skills  2. Encourage participation in management  3. Identify potential discharge barriers on admission and throughout hospital stay  4. Involve patient/family/caregiver in discharge planning process  5. Collaborate with case management/ for discharge needs  Outcome: Progressing  Flowsheets (Taken 8/7/2024 1036)  Patient discharge needs are met: Assess patient for self-management skills     Problem: Cardiovascular - Adult  Goal: Absence of cardiac dysrhythmias or at baseline  Description: INTERVENTIONS:  1. Continuous cardiac monitoring, monitor vital signs, obtain 12 lead EKG if indicated  2. Administer antiarrhythmic and heart rate control medications as ordered  3.  Initiate emergency measures for life threatening arrhythmias  4. Monitor electrolytes and administer replacement therapy as ordered  Outcome: Progressing  Flowsheets (Taken 8/7/2024 1036)  Absence of cardiac dysrhythmias or at baseline: Continuous cardiac monitoring, monitor vital signs, obtain 12 lead EKG if indicated     Problem: Musculoskeletal - Adult  Goal: Maintain proper alignment of affected body part  Description: INTERVENTIONS:  1. Support and protect limb and body alignment per provider order  2. Instruct and reinforce with patient and family use of appropriate assistive device and precautions (e.g. spinal or hip dislocation precautions)  Outcome: Progressing  Flowsheets (Taken 8/7/2024 1036)  Maintain proper alignment of affected body part:   Support and protect limb and body alignment per provider's orders   Instruct and reinforce with patient and family use of appropriate assistive device and precautions (e.g. spinal or hip dislocation precautions)

## 2024-08-08 NOTE — MEDICATION HISTORY SPECIALIST NOTES
[facility] MS [id number]    CSN: [id number]  : [id number]    Information sources:  EPIC    Allergies verified.    Patient interviewed who provided all history. Patient verified medications and provided last doses. Verified with patient.    New medications added:  Lexapro 5 mg tablet - 1 tab QD    Profile was checked for  medications.

## 2024-08-08 NOTE — PLAN OF CARE
Problem: Knowledge Deficit  Goal: Patient/family/caregiver demonstrates understanding of disease process, treatment plan, medications, and discharge instructions  Description: INTERVENTIONS:   1. Complete learning assessment and assess knowledge base  2. Provide teaching at level of understanding   3. Provide teaching via preferred learning methods  Outcome: Completed     Problem: Potential for Compromised Skin Integrity  Goal: Skin Integrity is Maintained or Improved  Description: INTERVENTIONS:  1. Assess and monitor skin integrity  2. Collaborate with interdisciplinary team and initiate plans and interventions as needed  3. Alternate a full bath with partial baths for elderly   4. Monitor patient's hygiene practices   5. Collaborate with wound, ostomy, and continence nurse  Outcome: Completed  Goal: Nutritional status is improving  Description: INTERVENTIONS:  1. Monitor and assess patient for malnutrition (ex- brittle hair, bruises, dry skin, pale skin and conjunctiva, muscle wasting, smooth red tongue, and disorientation)  2. Monitor patient's weight and dietary intake as ordered or per policy  3. Determine patient's food preferences and provide high-protein, high-caloric foods as appropriate  4. Assist patient with eating   5. Allow adequate time for meals   6. Encourage patient to take dietary supplement as ordered   7. Collaborate with dietitian  8. Include patient/family/caregiver in decisions related to nutrition  Outcome: Completed  Goal: MOBILITY IS MAINTAINED OR IMPROVED  Description: INTERVENTIONS  1. Collaborate with interdisciplinary team and initiate plan and interventions as ordered (PT/OT)  2. Encourage ambulation  3. Up to chair for meals  4. Monitor for signs of deconditioning  Outcome: Completed     Problem: Urinary Incontinence  Goal: Perineal skin integrity is maintained or improved  Description: INTERVENTIONS:  1. Assess genitourinary system, perineal skin, labs (urinalysis), and history  of incontinence to include past management, aggravating, and alleviating factors  2. Collaborate with interdisciplinary team including wound, ostomy, and continence nurse and initiate plans and interventions as needed  4. Consider urine containment device  5. Apply skin protectant   6. Develop skin care regimen  7. Provide privacy when changing patient's incontinence device to maintain their dignity  Outcome: Completed     Problem: Pain - Adult  Goal: Verbalizes/displays adequate comfort level or baseline comfort level  Description: INTERVENTIONS:  1. Encourage patient to monitor pain and request interventions  2. Assess pain using the appropriate pain scale  3. Administer analgesics based on type and severity of pain and evaluate response  4. Educate/Implement non-pharmacological measures as appropriate and evaluate response  5. Consider cultural, developmental and social influences on pain and pain management  6. Notify Provider if interventions unsuccessful or patient reports new pain  Outcome: Completed     Problem: Discharge Barriers  Goal: Patient's discharge needs are met  Description: INTERVENTIONS:  1. Assess patient for self-management skills  2. Encourage participation in management  3. Identify potential discharge barriers on admission and throughout hospital stay  4. Involve patient/family/caregiver in discharge planning process  5. Collaborate with case management/ for discharge needs  Outcome: Completed     Problem: Cardiovascular - Adult  Goal: Absence of cardiac dysrhythmias or at baseline  Description: INTERVENTIONS:  1. Continuous cardiac monitoring, monitor vital signs, obtain 12 lead EKG if indicated  2. Administer antiarrhythmic and heart rate control medications as ordered  3. Initiate emergency measures for life threatening arrhythmias  4. Monitor electrolytes and administer replacement therapy as ordered  Outcome: Completed     Problem: Musculoskeletal - Adult  Goal: Maintain  proper alignment of affected body part  Description: INTERVENTIONS:  1. Support and protect limb and body alignment per provider order  2. Instruct and reinforce with patient and family use of appropriate assistive device and precautions (e.g. spinal or hip dislocation precautions)  Outcome: Completed

## 2024-09-18 NOTE — FLOWSHEET NOTE
[facility]. [address]  Phone: [phone]   Fax:     [phone]         Physical Therapy Treatment Note/ Progress Report: ,  Disregard D/C summary. MD sent her back for more PT    Date:  2021    Patient Name:  Priya Taylor    :  1960  MRN: [id number]    Pertinent Medical History: Additional Pertinent Hx: Natural fusion of C5,C6    Medical/Treatment Diagnosis Information:  · Diagnosis: M54.5 (ICD-10-CM) - Low back pain  · Treatment Diagnosis: decreased abilty to function    Insurance/Certification information:  PT Insurance Information: [employer]reil  Physician Information:  Referring Practitioner: Dr. Shiva Bray of care signed (Y/N): routed    Date of Patient follow up with Physician: not sure     Progress Report: []  Yes  [x]  No     Date Range for reporting period:  Beginnin2021  Ending:    Progress report due (10 Rx/or 30 days whichever is less):     Recertification due (POC duration/ or 90 days whichever is less):21    Visit # POC/ Insurance Allowable Auth Needed   11 16 per [facility] []Yes    []No     Functional Scale:       Date Assessed: at eval  Test: [place]-60  Score:     Pain level:  5/10     History of Injury:Patient is a 64 y/o female who was pulling a skid at [employer] and unloading them on 20. She started having  left thigh first and then back pain a few days later. She has had nothing done except for xrays since. She had an mri today. She c/o constant sharp nerve pain in her bialteral lb, hips and into her bilateral buttocks and hamstrings as well as left thigh and hip. She also has tingling in her vaginal area and tailbone. She says the pain is severe at times. She has used heating pads icy hot and some other things to help her back. She   Does  not know what makes it hurt more or feel better . She is working on light duty at this time.  Denies changes in bowel or bladder.        SUBJECTIVE:  Pt states, \" They haven;t done any treatment since I was hurt back in June \"  2/18/21  Pt states, \" I am not quite as sore this week, still having a lot of nerve pain \"  2/23/21  Pt states, \" The doctor said I have bulging discs in my back and that's whats causing the nerve pain. I have tingling in my privates, left hip and leg and my right hip and waist.  You can't stretch my back again, it hurt my neck \"  2/25/21  Pt states, \" I didn't work today and I'm not as bad, yesterday I was really bad. It gets so bad on the left that it's hard to stand, getting shooting pain in my waist on both sides and a lot of tingling \"  'it gets so bad I have to grab it \" \"  03/02/21 Patient reports traction helped some,tingling in her hips and both sides of the waist still bad after work. 03/04/21 Patient reports she still getting tingling in both legs and around waist.States driving is still tends to aggrevate her symptoms. 03/08/21 Patient reports tingling is about the same,still around the waist and in both hips. 3/15/21 Pt reports pain and numbness are about the same. 4/6/21  Pt states, \" I'm on anti inflammatories,  Seems to be helping a little \" \" I'm still having numbness and tingling in my feet that is severe, numbness in my butt and in my back \"  4/14/21  Pt states, \" I'm still getting the tingling in my right leg, still having a lot of pain \"  4/21/21  Pt states, \" stiff and sore today, back of my thighs, front of my thighs are sore and tingling, It s tingling in my ass as well like always. I'm also really sore and tingling in the middle of my back \" \"    OBJECTIVE: 3/15/21 TLS spine range of motion all ranges wfl's no reports of increased pain, Quadrant test L/R each increase ipsilat. LBP. .  4/6/21  Flex- wfl, ext-10, sbr-20, sbl-20, rotr-50, rotl-50  Pain in left lb with ext and sb.      ROM   Comments   Lumbar Flex wfl     Lumbar Ext 5 painful in right lb      ROM LEFT RIGHT Comments   Lumbar Side Bend 10 10 Pain in her right lb   Lumbar Rotation 50 50     Quadrant   + painful   Hip Flexion 100 100     Hip Abd 25 25     Hip ER 30 30     Hip IR 20 20     Hip Extension 5 5     Knee Ext         Knee Flex         Hamstring Flex -20 -20     Piriformis tight tight     Tanner test  + +                     RESTRICTIONS/PRECAUTIONS: She is having a lot of nerve pain that does not follow a distinct dermatome, She said the md said he is not too worried about that    Exercises/Interventions:     Therapeutic Ex (79762)   Min: Resistance/Reps Notes/Cues   Nu step      Prone resting          Ppt supine  5\" 20 x ea cues required to correct tech. Reported some discomfort at back and hips  Make sure flexion exercises are not causing tingling, will try Anny exs to see if it helps tingling    PPT prone  5\" 20 x     chelly pose     clams  Increases tingling   birddog X 20 ea (hip ext.) cues to train tech.     pilates press  Try pilates   saw     Piriformis stretch 60 x 2    Sl abd 3\" hold 15 x L/R ea cues for training                 Reported right hip stiffness and upper back stiffness following exercise. Bridges  5\" 15 x cues for tech. Seated nerve glide X 20 ea         Manual Intervention (62243) Min:15 20 min     Mfr to bilateral lumbar, yadira, itb, gr 4 PA's to lower thor and lumbar sl facet gapping gentle, unilateral PA's right Gr3 L3,4,5, mfr  to bilat gluts, piriformis. Very tight in right gluts, piriformis,  ((PA's at L3, L2 and L1 all reproduce parethesias at buttocks))  PA to L5 and L4 tolerated well    Flexion table SL facet gapping gr 4 bilat              NMR re-education (31538)   Min:     Mf Activation- re-ed     TrA Re-ed activation     Glute Max re-ed activation          Therapeutic Activity (28487) Min:      Went over resting postioins, biomechanics, work positions, discussed mechanism of injury.          Modalities  Min:      US US 1.5 cm2 to bilat lumbar activities related to improving balance, coordination, kinesthetic sense, posture, motor skill, proprioception of core, proximal hip and LE for self care, mobility, lifting, and ambulation      Manual Treatments:  PROM / STM / Oscillations-Mobs:  G-I, II, III, IV (PA's, Inf., Post.)  [x] (50183) Provided manual therapy to mobilize proximal hip and LS spine soft tissue/joints for the purpose of modulating pain, promoting relaxation,  increasing ROM, reducing/eliminating soft tissue swelling/inflammation/restriction, improving soft tissue extensibility and allowing for proper ROM for normal function with self care, mobility, lifting and ambulation. [facility] TIME CODES    MODALITY                      START TIME   STOP TIME   430   man 430   445   exs 445 500   ptx 500 515       Charges:  Timed Code Treatment Minutes: 45   Total Treatment Minutes: 60     [] EVAL (LOW) 11987 (typically 20 minutes face-to-face)  [] EVAL (MOD) 96168 (typically 30 minutes face-to-face)  [] EVAL (HIGH) 70806 (typically 45 minutes face-to-face)  [] RE-EVAL     [x] JX(36003) x  2   [] Dry needle 1 or 2 Muscles (08490)  [] NMR (25591) x   [] Dry needle 3+ Muscles (66154)  [x] Manual (73201) x  1   [] Ultrasound (58872) x  [] TA (32102) x1     [x] Mech Traction (11236)  [] ES(attended) (18403)     [] ES (un) (45071):   [] Vasopump (06410) [] Ionto (10991)   [] Other:  GOALS:  Patient stated goal: \" I want to function without pain \"  []? Progressing: []? Met: [x]? Not Met: []? Adjusted  Therapist goals for Patient:   Short Term Goals: To be achieved in: 2 weeks  1. Independent in HEP and progression per patient tolerance, in order to prevent re-injury. [x]? Progressing: []? Met: []? Not Met: []? Adjusted  2. Patient will have a decrease in pain to facilitate improvement in movement, function, and ADLs as indicated by Functional Deficits. []? Progressing: []? Met: [x]? Not Met: []? Adjusted     Long Term Goals:  To be achieved in: 8 weeks  1. Disability index score of 25% or less for the SHARAD to assist with reaching prior level of function. []? Progressing: []? Met: [x]? Not Met: []? Adjusted  2. Patient will demonstrate increased AROM to  75% WNL, good LS mobility, good hip ROM to allow for proper joint functioning as indicated by patients Functional Deficits. []? Progressing: []? Met: [x]? Not Met: []? Adjusted  3. Patient will demonstrate an increase in Strength to good proximal hip and core activation to allow for proper functional mobility as indicated by patients Functional Deficits. []? Progressing: []? Met: [x]? Not Met: []? Adjusted  4. Patient will return to 80%  functional activities without increased symptoms or restriction. []? Progressing: []? Met: [x]? Not Met: []? Adjusted  5.(patient specific functional goal)    []? Progressing: []? Met: [x]? Not Met: []? Adjusted              ASSESSMENT:  See eval    Treatment/Activity Tolerance:  [x] Patient tolerated treatment well [] Patient limited by fatique  [] Patient limited by pain  [] Patient limited by other medical complications  [] Other:     Overall Progression Towards Functional goals/ Treatment Progress Update:  [] Patient is progressing as expected towards functional goals listed. [x] Progression is slowed due to complexities/Impairments listed. No one is sure what is causing her pain and tingling. [] Progression has been slowed due to co-morbidities.   [] Plan just implemented, too soon to assess goals progression <30days   [] Goals require adjustment due to lack of progress  [] Patient is not progressing as expected and requires additional follow up with physician  [] Other:    Prognosis for POC: [] Good [] Fair  [x] Poor    Patient requires continued skilled intervention: [x] Yes  [] No        PLAN: Pt may benefit from a Anny progression, patient appears to be hypermobile with week core  Lumbar rom, strength, myofascial release, muscle enregy technique, joint mobs  le stretches, ns exercises, hep, modalities as needed, Most of pain is in right L4,5S1 area, Start with US, lainer, michaelt mobs to this area, She has problems with extension more then flexion, trying pelvic , try working on jordi extension next rx due to tingling everywhere and assess ptx. 4/14/21  Pt not showing much progress. We had d/c'ed her due to this but she was sent back by md for 6 more rx. Still having the same signs and symptoms. Very tender in right lumbar facets. Will attempt to improve facet motion and ns , flexibility exs. [] Continue per plan of care [] Alter current plan (see comments)  [x] Plan of care initiated [] Hold pending MD visit [] Discharge    Electronically signed by: Leonie Underwood, PT    Note: If patient does not return for scheduled/recommended follow up visits, this note will serve as a discharge from care along with the most recent update on progress. 797

## 2024-09-23 ENCOUNTER — HOSPITAL ENCOUNTER (OUTPATIENT)
Age: 64
Discharge: HOME | End: 2024-09-23

## 2024-09-23 ENCOUNTER — HOSPITAL ENCOUNTER (OUTPATIENT)
Dept: GENERAL RADIOLOGY | Age: 64
Discharge: HOME | End: 2024-09-23
Attending: NEUROLOGICAL SURGERY
Payer: COMMERCIAL

## 2024-09-23 PROCEDURE — 72070 X-RAY EXAM THORAC SPINE 2VWS: CPT

## 2024-09-23 PROCEDURE — 72100 X-RAY EXAM L-S SPINE 2/3 VWS: CPT
